# Patient Record
Sex: MALE | Race: WHITE | NOT HISPANIC OR LATINO | URBAN - METROPOLITAN AREA
[De-identification: names, ages, dates, MRNs, and addresses within clinical notes are randomized per-mention and may not be internally consistent; named-entity substitution may affect disease eponyms.]

---

## 2017-02-08 ENCOUNTER — OUTPATIENT (OUTPATIENT)
Dept: OUTPATIENT SERVICES | Facility: HOSPITAL | Age: 72
LOS: 1 days | Discharge: HOME | End: 2017-02-08

## 2017-05-01 ENCOUNTER — OUTPATIENT (OUTPATIENT)
Dept: OUTPATIENT SERVICES | Facility: HOSPITAL | Age: 72
LOS: 1 days | Discharge: HOME | End: 2017-05-01

## 2017-06-28 DIAGNOSIS — N50.819 TESTICULAR PAIN, UNSPECIFIED: ICD-10-CM

## 2017-07-05 ENCOUNTER — APPOINTMENT (OUTPATIENT)
Dept: HEMATOLOGY ONCOLOGY | Facility: CLINIC | Age: 72
End: 2017-07-05

## 2017-07-05 ENCOUNTER — OUTPATIENT (OUTPATIENT)
Dept: OUTPATIENT SERVICES | Facility: HOSPITAL | Age: 72
LOS: 1 days | Discharge: HOME | End: 2017-07-05

## 2017-07-05 VITALS
RESPIRATION RATE: 14 BRPM | HEIGHT: 69 IN | SYSTOLIC BLOOD PRESSURE: 150 MMHG | TEMPERATURE: 98.1 F | BODY MASS INDEX: 28.73 KG/M2 | DIASTOLIC BLOOD PRESSURE: 90 MMHG | HEART RATE: 81 BPM | WEIGHT: 194 LBS

## 2017-07-05 DIAGNOSIS — N39.0 URINARY TRACT INFECTION, SITE NOT SPECIFIED: ICD-10-CM

## 2017-07-05 DIAGNOSIS — M47.816 SPONDYLOSIS WITHOUT MYELOPATHY OR RADICULOPATHY, LUMBAR REGION: ICD-10-CM

## 2017-07-08 ENCOUNTER — OUTPATIENT (OUTPATIENT)
Dept: OUTPATIENT SERVICES | Facility: HOSPITAL | Age: 72
LOS: 1 days | Discharge: HOME | End: 2017-07-08

## 2017-07-08 DIAGNOSIS — D17.79 BENIGN LIPOMATOUS NEOPLASM OF OTHER SITES: ICD-10-CM

## 2017-07-08 DIAGNOSIS — M47.816 SPONDYLOSIS WITHOUT MYELOPATHY OR RADICULOPATHY, LUMBAR REGION: ICD-10-CM

## 2017-07-08 DIAGNOSIS — N39.0 URINARY TRACT INFECTION, SITE NOT SPECIFIED: ICD-10-CM

## 2017-07-17 DIAGNOSIS — D17.79 BENIGN LIPOMATOUS NEOPLASM OF OTHER SITES: ICD-10-CM

## 2017-08-09 ENCOUNTER — OUTPATIENT (OUTPATIENT)
Dept: OUTPATIENT SERVICES | Facility: HOSPITAL | Age: 72
LOS: 1 days | Discharge: HOME | End: 2017-08-09

## 2017-08-09 DIAGNOSIS — N45.1 EPIDIDYMITIS: ICD-10-CM

## 2017-08-09 DIAGNOSIS — M47.816 SPONDYLOSIS WITHOUT MYELOPATHY OR RADICULOPATHY, LUMBAR REGION: ICD-10-CM

## 2017-08-09 DIAGNOSIS — N39.0 URINARY TRACT INFECTION, SITE NOT SPECIFIED: ICD-10-CM

## 2017-08-11 ENCOUNTER — OUTPATIENT (OUTPATIENT)
Dept: OUTPATIENT SERVICES | Facility: HOSPITAL | Age: 72
LOS: 1 days | Discharge: HOME | End: 2017-08-11

## 2017-08-11 DIAGNOSIS — N45.1 EPIDIDYMITIS: ICD-10-CM

## 2017-08-11 DIAGNOSIS — M47.816 SPONDYLOSIS WITHOUT MYELOPATHY OR RADICULOPATHY, LUMBAR REGION: ICD-10-CM

## 2017-08-11 DIAGNOSIS — N39.0 URINARY TRACT INFECTION, SITE NOT SPECIFIED: ICD-10-CM

## 2017-08-24 ENCOUNTER — APPOINTMENT (OUTPATIENT)
Dept: UROLOGY | Facility: CLINIC | Age: 72
End: 2017-08-24
Payer: MEDICARE

## 2017-08-24 VITALS
HEART RATE: 92 BPM | HEIGHT: 68 IN | DIASTOLIC BLOOD PRESSURE: 78 MMHG | BODY MASS INDEX: 28.79 KG/M2 | SYSTOLIC BLOOD PRESSURE: 132 MMHG | WEIGHT: 190 LBS

## 2017-08-24 DIAGNOSIS — N50.819 TESTICULAR PAIN, UNSPECIFIED: ICD-10-CM

## 2017-08-24 PROCEDURE — 99213 OFFICE O/P EST LOW 20 MIN: CPT

## 2017-08-24 RX ORDER — CIPROFLOXACIN HYDROCHLORIDE 500 MG/1
500 TABLET, FILM COATED ORAL TWICE DAILY
Qty: 20 | Refills: 0 | Status: ACTIVE | COMMUNITY
Start: 2017-08-24 | End: 1900-01-01

## 2017-09-13 ENCOUNTER — RESULT REVIEW (OUTPATIENT)
Age: 72
End: 2017-09-13

## 2017-09-13 ENCOUNTER — OUTPATIENT (OUTPATIENT)
Dept: OUTPATIENT SERVICES | Facility: HOSPITAL | Age: 72
LOS: 1 days | Discharge: HOME | End: 2017-09-13

## 2017-09-13 ENCOUNTER — APPOINTMENT (OUTPATIENT)
Dept: HEMATOLOGY ONCOLOGY | Facility: CLINIC | Age: 72
End: 2017-09-13

## 2017-09-13 DIAGNOSIS — M47.816 SPONDYLOSIS WITHOUT MYELOPATHY OR RADICULOPATHY, LUMBAR REGION: ICD-10-CM

## 2017-09-13 DIAGNOSIS — D49.7 NEOPLASM OF UNSPECIFIED BEHAVIOR OF ENDOCRINE GLANDS AND OTHER PARTS OF NERVOUS SYSTEM: ICD-10-CM

## 2017-09-13 DIAGNOSIS — N39.0 URINARY TRACT INFECTION, SITE NOT SPECIFIED: ICD-10-CM

## 2017-09-21 ENCOUNTER — OUTPATIENT (OUTPATIENT)
Dept: OUTPATIENT SERVICES | Facility: HOSPITAL | Age: 72
LOS: 1 days | Discharge: HOME | End: 2017-09-21

## 2017-09-21 DIAGNOSIS — N20.0 CALCULUS OF KIDNEY: ICD-10-CM

## 2017-09-21 DIAGNOSIS — M47.816 SPONDYLOSIS WITHOUT MYELOPATHY OR RADICULOPATHY, LUMBAR REGION: ICD-10-CM

## 2017-09-21 DIAGNOSIS — N39.0 URINARY TRACT INFECTION, SITE NOT SPECIFIED: ICD-10-CM

## 2017-09-21 LAB
COMPN STONE: NORMAL
COMPN STONE: NORMAL
NIDUS STONE QL: NORMAL
SPECIMEN SOURCE: NORMAL
WT STONE: 0.04 G

## 2017-10-12 ENCOUNTER — APPOINTMENT (OUTPATIENT)
Dept: UROLOGY | Facility: CLINIC | Age: 72
End: 2017-10-12
Payer: MEDICARE

## 2017-10-12 PROCEDURE — 99211 OFF/OP EST MAY X REQ PHY/QHP: CPT

## 2017-10-25 ENCOUNTER — OUTPATIENT (OUTPATIENT)
Dept: OUTPATIENT SERVICES | Facility: HOSPITAL | Age: 72
LOS: 1 days | Discharge: HOME | End: 2017-10-25

## 2017-10-25 DIAGNOSIS — N39.0 URINARY TRACT INFECTION, SITE NOT SPECIFIED: ICD-10-CM

## 2017-10-25 DIAGNOSIS — Z00.00 ENCOUNTER FOR GENERAL ADULT MEDICAL EXAMINATION WITHOUT ABNORMAL FINDINGS: ICD-10-CM

## 2017-10-25 DIAGNOSIS — M47.816 SPONDYLOSIS WITHOUT MYELOPATHY OR RADICULOPATHY, LUMBAR REGION: ICD-10-CM

## 2017-10-30 LAB
COMPN STONE: NORMAL
COMPN STONE: NORMAL
NIDUS STONE QL: NORMAL
SPECIMEN SOURCE: NORMAL
WT STONE: 0.17 G

## 2017-11-02 ENCOUNTER — APPOINTMENT (OUTPATIENT)
Dept: UROLOGY | Facility: CLINIC | Age: 72
End: 2017-11-02
Payer: MEDICARE

## 2017-11-02 VITALS
WEIGHT: 190 LBS | DIASTOLIC BLOOD PRESSURE: 77 MMHG | BODY MASS INDEX: 28.79 KG/M2 | HEART RATE: 78 BPM | HEIGHT: 68 IN | SYSTOLIC BLOOD PRESSURE: 132 MMHG

## 2017-11-02 PROCEDURE — 99213 OFFICE O/P EST LOW 20 MIN: CPT

## 2017-12-07 ENCOUNTER — TRANSCRIPTION ENCOUNTER (OUTPATIENT)
Age: 72
End: 2017-12-07

## 2017-12-13 ENCOUNTER — OUTPATIENT (OUTPATIENT)
Dept: OUTPATIENT SERVICES | Facility: HOSPITAL | Age: 72
LOS: 1 days | Discharge: HOME | End: 2017-12-13

## 2017-12-13 DIAGNOSIS — M47.816 SPONDYLOSIS WITHOUT MYELOPATHY OR RADICULOPATHY, LUMBAR REGION: ICD-10-CM

## 2017-12-13 DIAGNOSIS — N39.0 URINARY TRACT INFECTION, SITE NOT SPECIFIED: ICD-10-CM

## 2017-12-15 DIAGNOSIS — E11.9 TYPE 2 DIABETES MELLITUS WITHOUT COMPLICATIONS: ICD-10-CM

## 2017-12-15 DIAGNOSIS — M65.312 TRIGGER THUMB, LEFT THUMB: ICD-10-CM

## 2018-01-04 ENCOUNTER — APPOINTMENT (OUTPATIENT)
Dept: UROLOGY | Facility: CLINIC | Age: 73
End: 2018-01-04

## 2018-01-10 ENCOUNTER — OUTPATIENT (OUTPATIENT)
Dept: OUTPATIENT SERVICES | Facility: HOSPITAL | Age: 73
LOS: 1 days | Discharge: HOME | End: 2018-01-10

## 2018-01-10 DIAGNOSIS — E78.5 HYPERLIPIDEMIA, UNSPECIFIED: ICD-10-CM

## 2018-01-10 DIAGNOSIS — D64.9 ANEMIA, UNSPECIFIED: ICD-10-CM

## 2018-01-10 DIAGNOSIS — M47.816 SPONDYLOSIS WITHOUT MYELOPATHY OR RADICULOPATHY, LUMBAR REGION: ICD-10-CM

## 2018-01-10 DIAGNOSIS — R53.82 CHRONIC FATIGUE, UNSPECIFIED: ICD-10-CM

## 2018-01-10 DIAGNOSIS — N39.0 URINARY TRACT INFECTION, SITE NOT SPECIFIED: ICD-10-CM

## 2018-01-10 DIAGNOSIS — E11.9 TYPE 2 DIABETES MELLITUS WITHOUT COMPLICATIONS: ICD-10-CM

## 2018-02-08 ENCOUNTER — APPOINTMENT (OUTPATIENT)
Dept: UROLOGY | Facility: CLINIC | Age: 73
End: 2018-02-08
Payer: MEDICARE

## 2018-02-08 VITALS
HEIGHT: 68 IN | SYSTOLIC BLOOD PRESSURE: 144 MMHG | HEART RATE: 72 BPM | WEIGHT: 190 LBS | BODY MASS INDEX: 28.79 KG/M2 | DIASTOLIC BLOOD PRESSURE: 74 MMHG

## 2018-02-08 PROCEDURE — 99213 OFFICE O/P EST LOW 20 MIN: CPT

## 2018-02-16 ENCOUNTER — TRANSCRIPTION ENCOUNTER (OUTPATIENT)
Age: 73
End: 2018-02-16

## 2018-02-22 ENCOUNTER — APPOINTMENT (OUTPATIENT)
Dept: UROLOGY | Facility: CLINIC | Age: 73
End: 2018-02-22

## 2018-03-29 ENCOUNTER — APPOINTMENT (OUTPATIENT)
Dept: UROLOGY | Facility: CLINIC | Age: 73
End: 2018-03-29
Payer: MEDICARE

## 2018-03-29 VITALS
WEIGHT: 190 LBS | BODY MASS INDEX: 28.79 KG/M2 | DIASTOLIC BLOOD PRESSURE: 71 MMHG | HEART RATE: 73 BPM | HEIGHT: 68 IN | SYSTOLIC BLOOD PRESSURE: 140 MMHG

## 2018-03-29 DIAGNOSIS — D17.79 BENIGN LIPOMATOUS NEOPLASM OF OTHER SITES: ICD-10-CM

## 2018-03-29 DIAGNOSIS — Z71.89 OTHER SPECIFIED COUNSELING: ICD-10-CM

## 2018-03-29 PROCEDURE — 99213 OFFICE O/P EST LOW 20 MIN: CPT

## 2018-03-29 NOTE — LETTER HEADER
[Ryan Maciel MD] : Ryan Maciel MD [Director of Urologic Oncology] : Director of Urologic Oncology [Cancer Services] : Cancer Services [Tel [phone]] : Tel [phone] [Fax [phone]] : Fax [phone]

## 2018-03-29 NOTE — ASSESSMENT
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

## 2018-03-29 NOTE — HISTORY OF PRESENT ILLNESS
[Currently Experiencing ___] :  [unfilled] [None] : None [FreeTextEntry1] : BINU WAGONER is a 72 year old male with a past medical history of kidney stones and BPH and on Flomax BID . Presents to the office today for a follow up, last seen on 2/8/2018. Patient is here to review 24 hour urine results. Patient states he has increased his daily fluid intake and decreased his salt intake. Last PSA 1.32 on 8/2017. \par 3/10/2018\par Urine Volume 1.85 (>2.5 L), SS CaOx 7.35 (  6-10), Urine OXalate 45 (20-40) , SS CaP 1.44 (0.5-2) , pH 6.211 (5.8-6.2) [Dysuria] : no dysuria [Hematuria - Gross] : no gross hematuria [Flank Pain] : no flank pain

## 2018-03-29 NOTE — LETTER BODY
[Dear  ___] : Dear  [unfilled], [Consult Letter:] : I had the pleasure of evaluating your patient, [unfilled]. [Please see my note below.] : Please see my note below. [Consult Closing:] : Thank you very much for allowing me to participate in the care of this patient.  If you have any questions, please do not hesitate to contact me. [Sincerely,] : Sincerely, [FreeTextEntry2] : Dr. Torrey Hanks

## 2018-07-25 ENCOUNTER — OUTPATIENT (OUTPATIENT)
Dept: OUTPATIENT SERVICES | Facility: HOSPITAL | Age: 73
LOS: 1 days | Discharge: HOME | End: 2018-07-25

## 2018-07-25 DIAGNOSIS — D17.79 BENIGN LIPOMATOUS NEOPLASM OF OTHER SITES: ICD-10-CM

## 2018-08-23 ENCOUNTER — APPOINTMENT (OUTPATIENT)
Dept: UROLOGY | Facility: CLINIC | Age: 73
End: 2018-08-23

## 2018-08-23 ENCOUNTER — APPOINTMENT (OUTPATIENT)
Dept: UROLOGY | Facility: CLINIC | Age: 73
End: 2018-08-23
Payer: MEDICARE

## 2018-08-30 ENCOUNTER — APPOINTMENT (OUTPATIENT)
Dept: UROLOGY | Facility: CLINIC | Age: 73
End: 2018-08-30
Payer: MEDICARE

## 2018-08-30 VITALS
BODY MASS INDEX: 28.79 KG/M2 | WEIGHT: 190 LBS | HEIGHT: 68 IN | SYSTOLIC BLOOD PRESSURE: 134 MMHG | HEART RATE: 76 BPM | DIASTOLIC BLOOD PRESSURE: 78 MMHG

## 2018-08-30 PROCEDURE — 99214 OFFICE O/P EST MOD 30 MIN: CPT

## 2018-09-03 NOTE — HISTORY OF PRESENT ILLNESS
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 \par CT scan 7/25/2018 - 7.7 x 4.9 x 5.9 cm\par stable in size with last study at [facility] in April 2016\par the US measurements 6.1 cm x 6.2 cm x 5.6 cm\par but the CT scan compared it to a prior study from 3/21/2010 - noted size change from 5.3 x 3.9 to 7.7 x 5.9 cm with coarse calcifications seen along the medial component - no hydronephrosis was seen\par 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 Hanks as well who manages his urinary complaints and follows the above mentioned adrenal myelolipoma\par \par he has passed several stones and he sent them to us for analysis \par calcium oxalate dihydrate\par calcium oxalate monohydrate\par carbonate Apatite\par \par \par the patient reports no recent stone passage or active issues\par no flank pain\par no hematuria\par no dysuria\par \par he is here to review his CT scan and discuss mgt of the adrenal

## 2018-09-03 NOTE — ASSESSMENT
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

## 2018-09-03 NOTE — LETTER BODY
[Dear  ___] : Dear  [unfilled], [Consult Letter:] : I had the pleasure of evaluating your patient, [unfilled]. [Please see my note below.] : Please see my note below. [Consult Closing:] : Thank you very much for allowing me to participate in the care of this patient.  If you have any questions, please do not hesitate to contact me. [Sincerely,] : Sincerely, [FreeTextEntry3] : Cadence Maciel MD, FACS\par

## 2018-10-27 ENCOUNTER — OUTPATIENT (OUTPATIENT)
Dept: OUTPATIENT SERVICES | Facility: HOSPITAL | Age: 73
LOS: 1 days | Discharge: HOME | End: 2018-10-27

## 2018-10-27 DIAGNOSIS — R91.8 OTHER NONSPECIFIC ABNORMAL FINDING OF LUNG FIELD: ICD-10-CM

## 2019-03-26 ENCOUNTER — TRANSCRIPTION ENCOUNTER (OUTPATIENT)
Age: 74
End: 2019-03-26

## 2019-04-06 ENCOUNTER — OUTPATIENT (OUTPATIENT)
Dept: OUTPATIENT SERVICES | Facility: HOSPITAL | Age: 74
LOS: 1 days | Discharge: HOME | End: 2019-04-06

## 2019-04-06 DIAGNOSIS — I10 ESSENTIAL (PRIMARY) HYPERTENSION: ICD-10-CM

## 2019-04-06 DIAGNOSIS — E11.9 TYPE 2 DIABETES MELLITUS WITHOUT COMPLICATIONS: ICD-10-CM

## 2019-04-06 DIAGNOSIS — E78.5 HYPERLIPIDEMIA, UNSPECIFIED: ICD-10-CM

## 2019-07-11 RX ORDER — TAMSULOSIN HYDROCHLORIDE 0.4 MG/1
0.4 CAPSULE ORAL
Qty: 3 | Refills: 0 | Status: ACTIVE | COMMUNITY

## 2019-07-25 ENCOUNTER — APPOINTMENT (OUTPATIENT)
Dept: UROLOGY | Facility: CLINIC | Age: 74
End: 2019-07-25
Payer: MEDICARE

## 2019-07-25 PROCEDURE — 99214 OFFICE O/P EST MOD 30 MIN: CPT

## 2019-07-25 NOTE — ASSESSMENT
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

## 2019-07-25 NOTE — HISTORY OF PRESENT ILLNESS
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 \par CT scan 7/25/2018 - 7.7 x 4.9 x 5.9 cm\par stable in size with last study at [facility] in April 2016\par the US measurements 6.1 cm x 6.2 cm x 5.6 cm\par but the CT scan compared it to a prior study from 3/21/2010 - noted size change from 5.3 x 3.9 to 7.7 x 5.9 cm with coarse calcifications seen along the medial component - no hydronephrosis was seen\par 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

## 2019-08-20 ENCOUNTER — OUTPATIENT (OUTPATIENT)
Dept: OUTPATIENT SERVICES | Facility: HOSPITAL | Age: 74
LOS: 1 days | Discharge: HOME | End: 2019-08-20

## 2019-08-20 DIAGNOSIS — R97.20 ELEVATED PROSTATE SPECIFIC ANTIGEN [PSA]: ICD-10-CM

## 2019-12-05 ENCOUNTER — OUTPATIENT (OUTPATIENT)
Dept: OUTPATIENT SERVICES | Facility: HOSPITAL | Age: 74
LOS: 1 days | Discharge: HOME | End: 2019-12-05

## 2019-12-05 DIAGNOSIS — D64.9 ANEMIA, UNSPECIFIED: ICD-10-CM

## 2019-12-05 DIAGNOSIS — E11.9 TYPE 2 DIABETES MELLITUS WITHOUT COMPLICATIONS: ICD-10-CM

## 2020-06-06 ENCOUNTER — OUTPATIENT (OUTPATIENT)
Dept: OUTPATIENT SERVICES | Facility: HOSPITAL | Age: 75
LOS: 1 days | Discharge: HOME | End: 2020-06-06
Payer: MEDICARE

## 2020-06-06 ENCOUNTER — RESULT REVIEW (OUTPATIENT)
Age: 75
End: 2020-06-06

## 2020-06-06 DIAGNOSIS — D49.7 NEOPLASM OF UNSPECIFIED BEHAVIOR OF ENDOCRINE GLANDS AND OTHER PARTS OF NERVOUS SYSTEM: ICD-10-CM

## 2020-06-06 PROCEDURE — 76770 US EXAM ABDO BACK WALL COMP: CPT | Mod: 26

## 2020-06-18 PROBLEM — N50.819 ORCHALGIA: Status: ACTIVE | Noted: 2017-08-24

## 2020-08-24 ENCOUNTER — APPOINTMENT (OUTPATIENT)
Dept: UROLOGY | Facility: CLINIC | Age: 75
End: 2020-08-24
Payer: MEDICARE

## 2020-08-24 VITALS — HEIGHT: 68 IN | BODY MASS INDEX: 28.79 KG/M2 | WEIGHT: 190 LBS | TEMPERATURE: 98.1 F

## 2020-08-24 DIAGNOSIS — D30.00 BENIGN NEOPLASM OF UNSPECIFIED KIDNEY: ICD-10-CM

## 2020-08-24 PROCEDURE — 99213 OFFICE O/P EST LOW 20 MIN: CPT

## 2020-08-24 NOTE — ASSESSMENT
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

## 2020-08-24 NOTE — HISTORY OF PRESENT ILLNESS
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 \par \par Right adrenal 5.2 x 5.5 x 5.9 cm calcium containing myelolipoma. \par \par Right renal mid pole 1.1 cm angiomyolipoma. Left renal cyst with mural \par calcifications, as above. \par \par Enlarged prostate measuring 46 cc in volume. \par \par Urinary bladder postvoid residual 115 cc. 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

## 2020-11-13 ENCOUNTER — RX RENEWAL (OUTPATIENT)
Age: 75
End: 2020-11-13

## 2021-01-21 ENCOUNTER — RX RENEWAL (OUTPATIENT)
Age: 76
End: 2021-01-21

## 2021-02-17 ENCOUNTER — RX RENEWAL (OUTPATIENT)
Age: 76
End: 2021-02-17

## 2021-03-11 ENCOUNTER — RESULT REVIEW (OUTPATIENT)
Age: 76
End: 2021-03-11

## 2021-03-11 ENCOUNTER — OUTPATIENT (OUTPATIENT)
Dept: OUTPATIENT SERVICES | Facility: HOSPITAL | Age: 76
LOS: 1 days | Discharge: HOME | End: 2021-03-11
Payer: MEDICARE

## 2021-03-11 DIAGNOSIS — R05 COUGH: ICD-10-CM

## 2021-03-11 PROCEDURE — 71046 X-RAY EXAM CHEST 2 VIEWS: CPT | Mod: 26

## 2021-03-12 ENCOUNTER — EMERGENCY (EMERGENCY)
Facility: HOSPITAL | Age: 76
LOS: 0 days | Discharge: HOME | End: 2021-03-12
Attending: STUDENT IN AN ORGANIZED HEALTH CARE EDUCATION/TRAINING PROGRAM | Admitting: STUDENT IN AN ORGANIZED HEALTH CARE EDUCATION/TRAINING PROGRAM
Payer: MEDICARE

## 2021-03-12 VITALS
OXYGEN SATURATION: 96 % | RESPIRATION RATE: 18 BRPM | DIASTOLIC BLOOD PRESSURE: 60 MMHG | SYSTOLIC BLOOD PRESSURE: 135 MMHG | TEMPERATURE: 103 F | HEART RATE: 86 BPM

## 2021-03-12 VITALS — SYSTOLIC BLOOD PRESSURE: 122 MMHG | HEART RATE: 72 BPM | RESPIRATION RATE: 18 BRPM | DIASTOLIC BLOOD PRESSURE: 62 MMHG

## 2021-03-12 DIAGNOSIS — F17.200 NICOTINE DEPENDENCE, UNSPECIFIED, UNCOMPLICATED: ICD-10-CM

## 2021-03-12 DIAGNOSIS — R10.84 GENERALIZED ABDOMINAL PAIN: ICD-10-CM

## 2021-03-12 DIAGNOSIS — U07.1 COVID-19: ICD-10-CM

## 2021-03-12 DIAGNOSIS — R50.9 FEVER, UNSPECIFIED: ICD-10-CM

## 2021-03-12 LAB
ALBUMIN SERPL ELPH-MCNC: 3.6 G/DL — SIGNIFICANT CHANGE UP (ref 3.5–5.2)
ALP SERPL-CCNC: 45 U/L — SIGNIFICANT CHANGE UP (ref 30–115)
ALT FLD-CCNC: 14 U/L — SIGNIFICANT CHANGE UP (ref 0–41)
ANION GAP SERPL CALC-SCNC: 12 MMOL/L — SIGNIFICANT CHANGE UP (ref 7–14)
APPEARANCE UR: CLEAR — SIGNIFICANT CHANGE UP
AST SERPL-CCNC: 22 U/L — SIGNIFICANT CHANGE UP (ref 0–41)
BACTERIA # UR AUTO: NEGATIVE — SIGNIFICANT CHANGE UP
BASOPHILS # BLD AUTO: 0.02 K/UL — SIGNIFICANT CHANGE UP (ref 0–0.2)
BASOPHILS NFR BLD AUTO: 0.4 % — SIGNIFICANT CHANGE UP (ref 0–1)
BILIRUB SERPL-MCNC: 1 MG/DL — SIGNIFICANT CHANGE UP (ref 0.2–1.2)
BILIRUB UR-MCNC: NEGATIVE — SIGNIFICANT CHANGE UP
BUN SERPL-MCNC: 18 MG/DL — SIGNIFICANT CHANGE UP (ref 10–20)
CALCIUM SERPL-MCNC: 8.8 MG/DL — SIGNIFICANT CHANGE UP (ref 8.5–10.1)
CHLORIDE SERPL-SCNC: 98 MMOL/L — SIGNIFICANT CHANGE UP (ref 98–110)
CO2 SERPL-SCNC: 24 MMOL/L — SIGNIFICANT CHANGE UP (ref 17–32)
COLOR SPEC: YELLOW — SIGNIFICANT CHANGE UP
CREAT SERPL-MCNC: 0.9 MG/DL — SIGNIFICANT CHANGE UP (ref 0.7–1.5)
DIFF PNL FLD: ABNORMAL
EOSINOPHIL # BLD AUTO: 0.03 K/UL — SIGNIFICANT CHANGE UP (ref 0–0.7)
EOSINOPHIL NFR BLD AUTO: 0.6 % — SIGNIFICANT CHANGE UP (ref 0–8)
EPI CELLS # UR: 3 /HPF — SIGNIFICANT CHANGE UP (ref 0–5)
GLUCOSE SERPL-MCNC: 141 MG/DL — HIGH (ref 70–99)
GLUCOSE UR QL: NEGATIVE — SIGNIFICANT CHANGE UP
HCT VFR BLD CALC: 32.9 % — LOW (ref 42–52)
HGB BLD-MCNC: 11.1 G/DL — LOW (ref 14–18)
HYALINE CASTS # UR AUTO: 4 /LPF — SIGNIFICANT CHANGE UP (ref 0–7)
IMM GRANULOCYTES NFR BLD AUTO: 0.2 % — SIGNIFICANT CHANGE UP (ref 0.1–0.3)
KETONES UR-MCNC: NEGATIVE — SIGNIFICANT CHANGE UP
LACTATE SERPL-SCNC: 1.3 MMOL/L — SIGNIFICANT CHANGE UP (ref 0.7–2)
LEUKOCYTE ESTERASE UR-ACNC: ABNORMAL
LIDOCAIN IGE QN: 20 U/L — SIGNIFICANT CHANGE UP (ref 7–60)
LYMPHOCYTES # BLD AUTO: 0.89 K/UL — LOW (ref 1.2–3.4)
LYMPHOCYTES # BLD AUTO: 18.9 % — LOW (ref 20.5–51.1)
MCHC RBC-ENTMCNC: 32.8 PG — HIGH (ref 27–31)
MCHC RBC-ENTMCNC: 33.7 G/DL — SIGNIFICANT CHANGE UP (ref 32–37)
MCV RBC AUTO: 97.3 FL — HIGH (ref 80–94)
MONOCYTES # BLD AUTO: 0.66 K/UL — HIGH (ref 0.1–0.6)
MONOCYTES NFR BLD AUTO: 14 % — HIGH (ref 1.7–9.3)
NEUTROPHILS # BLD AUTO: 3.11 K/UL — SIGNIFICANT CHANGE UP (ref 1.4–6.5)
NEUTROPHILS NFR BLD AUTO: 65.9 % — SIGNIFICANT CHANGE UP (ref 42.2–75.2)
NITRITE UR-MCNC: NEGATIVE — SIGNIFICANT CHANGE UP
NRBC # BLD: 0 /100 WBCS — SIGNIFICANT CHANGE UP (ref 0–0)
PH UR: 6 — SIGNIFICANT CHANGE UP (ref 5–8)
PLATELET # BLD AUTO: 141 K/UL — SIGNIFICANT CHANGE UP (ref 130–400)
POTASSIUM SERPL-MCNC: 4.1 MMOL/L — SIGNIFICANT CHANGE UP (ref 3.5–5)
POTASSIUM SERPL-SCNC: 4.1 MMOL/L — SIGNIFICANT CHANGE UP (ref 3.5–5)
PROT SERPL-MCNC: 6.4 G/DL — SIGNIFICANT CHANGE UP (ref 6–8)
PROT UR-MCNC: ABNORMAL
RBC # BLD: 3.38 M/UL — LOW (ref 4.7–6.1)
RBC # FLD: 14.8 % — HIGH (ref 11.5–14.5)
RBC CASTS # UR COMP ASSIST: 5 /HPF — HIGH (ref 0–4)
SARS-COV-2 RNA SPEC QL NAA+PROBE: DETECTED
SODIUM SERPL-SCNC: 134 MMOL/L — LOW (ref 135–146)
SP GR SPEC: 1.03 — SIGNIFICANT CHANGE UP (ref 1.01–1.03)
UROBILINOGEN FLD QL: ABNORMAL
WBC # BLD: 4.72 K/UL — LOW (ref 4.8–10.8)
WBC # FLD AUTO: 4.72 K/UL — LOW (ref 4.8–10.8)
WBC UR QL: 13 /HPF — HIGH (ref 0–5)

## 2021-03-12 PROCEDURE — 71045 X-RAY EXAM CHEST 1 VIEW: CPT | Mod: 26

## 2021-03-12 PROCEDURE — 74177 CT ABD & PELVIS W/CONTRAST: CPT | Mod: 26

## 2021-03-12 PROCEDURE — 99285 EMERGENCY DEPT VISIT HI MDM: CPT

## 2021-03-12 RX ORDER — ACETAMINOPHEN 500 MG
975 TABLET ORAL ONCE
Refills: 0 | Status: COMPLETED | OUTPATIENT
Start: 2021-03-12 | End: 2021-03-12

## 2021-03-12 RX ADMIN — Medication 975 MILLIGRAM(S): at 15:05

## 2021-03-12 NOTE — ED ADULT NURSE NOTE - NSIMPLEMENTINTERV_GEN_ALL_ED
Implemented All Fall with Harm Risk Interventions:  [place] to call system. Call bell, personal items and telephone within reach. Instruct patient to call for assistance. Room bathroom lighting operational. Non-slip footwear when patient is off stretcher. Physically safe environment: no spills, clutter or unnecessary equipment. Stretcher in lowest position, wheels locked, appropriate side rails in place. Provide visual cue, wrist band, yellow gown, etc. Monitor gait and stability. Monitor for mental status changes and reorient to person, place, and time. Review medications for side effects contributing to fall risk. Reinforce activity limits and safety measures with patient and family. Provide visual clues: red socks.

## 2021-03-12 NOTE — ED PROVIDER NOTE - NS ED ROS FT
Review of Systems:  	•	CONSTITUTIONAL - + fever, no diaphoresis  	•	SKIN - no rash  	•	HEMATOLOGIC - no bleeding, no bruising  	•	EYES - no eye pain, no blurry vision  	•	ENT - no congestion  	•	RESPIRATORY - no shortness of breath, no cough  	•	CARDIAC - no chest pain, no palpitations  	•	GI - no abd pain, no nausea, no vomiting, no diarrhea, no constipation  	•	GENITO-URINARY - no dysuria; no hematuria, no increased urinary frequency  	•	MUSCULOSKELETAL - no joint paint, no swelling, no redness  	•	NEUROLOGIC - no weakness, no headache, no paresthesias, no LOC  	•	PSYCH - no anxiety, no depression  	All other ROS are negative except as documented in HPI.

## 2021-03-12 NOTE — ED PROVIDER NOTE - PATIENT PORTAL LINK FT
You can access the FollowMyHealth Patient Portal offered by [facility] by registering at the following website: [email or web address]. By joining [facility]’s FollowMyHealth portal, you will also be able to view your health information using other applications (apps) compatible with our system.

## 2021-03-12 NOTE — ED ADULT NURSE NOTE - PRO INTERPRETER NEED 2
Bedside shift change report given to Naomi Gurrola RN (oncoming nurse) by Amara Worthington RN (offgoing nurse). Report included the following information SBAR, Kardex, ED Summary, Intake/Output, MAR, Accordion, Recent Results, Med Rec Status and Cardiac Rhythm NSR. English

## 2021-03-12 NOTE — ED PROVIDER NOTE - CLINICAL SUMMARY MEDICAL DECISION MAKING FREE TEXT BOX
I personally evaluated the patient. I reviewed the Resident’s or Physician Assistant’s note (as assigned above), and agree with the findings and plan except as documented in my note. Patient evaluated for fever. Labs, cxr, ct abd/pelvis performed in ed. Patient afebrile, well appearing, vs stable prior to discharge. Given return precautions and isolation precautions.. I have fully discussed the medical management and delivery of care with the patient. I have discussed any available labs, imaging and treatment options with the patient. Patient confirms understanding and has been given detailed return precautions. Patient instructed to return to the ED should symptoms persist or worsen. Patient has demonstrated capacity and has verbalized understanding. Patient is well appearing upon discharge.

## 2021-03-12 NOTE — ED PROVIDER NOTE - ATTENDING CONTRIBUTION TO CARE
74 yo M pmh dm pw fever. Fever and body aches for the past week. No cp, no sob, no abd pain, no back pain, no n/v, no diarrhea, no urinary sx, no headache, no neck pain.     CONSTITUTIONAL: Well-developed; well-nourished; in no acute distress. Sitting up and providing appropriate history and physical examination  SKIN: skin exam is warm and dry, no acute rash.  HEAD: Normocephalic; atraumatic.  EYES: PERRL, 3 mm bilateral, no nystagmus, EOM intact; conjunctiva and sclera clear.  ENT: No nasal discharge; airway clear.  NECK: Supple; non tender. + full passive ROM in all directions. No JVD  CARD: S1, S2 normal; no murmurs, gallops, or rubs. Regular rate and rhythm. + Symmetric Strong Pulses  RESP: No wheezes, rales or rhonchi. Good air movement bilaterally  ABD: +ttp diffusely over abdomen. soft; non-distended. No Rebound, No Guarding, No signs of peritonitis, No CVA tenderness. No pulsatile abdominal mass. + Strong and Symmetric Pulses  EXT: Normal ROM. No clubbing, cyanosis or edema. Dp and Pt Pulses intact. Cap refill less than 3 seconds  NEURO: CN 2-12 intact, normal finger to nose, normal romberg, stable gait, no sensory or motor deficits, Alert, oriented, grossly unremarkable. No Focal deficits. GCS 15. NIH 0  PSYCH: Cooperative, appropriate.

## 2021-03-12 NOTE — ED PROVIDER NOTE - NSFOLLOWUPINSTRUCTIONS_ED_ALL_ED_FT
Please follow up with your primary care physician within 24-72 hours and return immediately if symptoms worsen.    Novel Coronavirus (COVID-19)  The Facts  What is a coronavirus?  Coronaviruses are a large family of viruses that cause illnesses ranging from the common cold  to more severe diseases such as Middle East Respiratory Syndrome (MERS) and Severe Acute  Respiratory Syndrome (SARS).  What is Novel Coronavirus (COVID-19)?  COVID-19 is a new strain of Coronavirus that has not been previously identified in humans. COVID-19  was identified in [place], [place], [place] in December 2019 (COVID-19). COVID-19 has  since been identified outside of [place], in a growing number of countries internationally, including  the [place].  Where can I find the most recent information about COVID-19?  The Centers for Disease Control and Prevention (CDC) is closely monitoring the outbreak caused by the  COVID-19. For the latest information about COVID- 19, visit the CDC website at  https://www.cdc.gov/coronavirus/index.html  How are coronaviruses spread?  Coronaviruses can be transmitted from person-to- person, usually after close contact with an infected person,  for example, in a household, workplace, or healthcare setting via droplets that become airborne after a cough  or sneeze by an affected person. These droplets can then infect a nearby person. It is likely transmission also  occurs by touching recently contaminated surfaces.  What are the symptoms of coronavirus infection?  It depends on the virus, but common signs include fever and/or respiratory symptoms such as  cough and shortness of breath. In more severe cases, infection can cause pneumonia, severe acute  respiratory syndrome, kidney failure and even death. Fortunately, most cases of COVID-19 have an  illness no different than the influenza “flu”. With a majority of these patients having mild symptoms  and overall mortality which appears to be not much different than the flu.  Is there a treatment for a COVID-19?  There is no specific treatment for disease caused by COVID-19. However, many of the symptoms can  be treated based on the patient’s clinical condition. Supportive care for infected persons can be highly  effective.  What can I do to protect myself?  Washing your hands, covering your cough, and disinfecting surfaces are the best precautionary  measures. It is also advisable to avoid close contact with anyone showing symptoms of respiratory  illness such as coughing and sneezing. Those with symptoms should wear a surgical mask when  around others.  What can I do to protect those around me?  If you have been identified as someone who may be infected with COVID-19, we recommend you  follow the self-isolation procedures outlined below to protect those around you and limit the spread  of this virus.   March 3, 2020  Recommendations for Patients Advised to Self-Isolate  for Possible COVID-19 Exposure  We recommend the below precautionary steps from now until 14 days from when you  returned from your travel or date of your last known possible contact:  - Do not go to work, school, or public areas. Avoid using public transportation, ride-sharing, or  taxis.  - As much as possible, separate yourself from other people in your home. If you can, you should  stay in a room and away from other people in your home. Also, you should use a separate  bathroom, if available.  - Wear the supplied mask whenever you are around other people.  - If you have a non-urgent medical appointment, please reschedule for a later date. If the  appointment is urgent, please call the healthcare provider and tell them that you are on selfisolation for possible COVID-19. This will help the healthcare provider’s office take steps to keep  other people from getting infected or exposed. If you can reschedule routine appointments, do  so.  - Wash your hands often with soap and water for at least 15 to 20 seconds or clean your hands  with an alcohol-based hand  that contains 60 to 95% alcohol, covering all surfaces of  your hands and rubbing them together until they feel dry. Soap and water should be used  preferentially if hands are visibly dirty.  - Cover your mouth and nose with a tissue when you cough or sneeze. Throw used tissues in a  lined trash can; immediately wash your hands.  - Avoid touching your eyes, nose, and mouth with your hands.  - Avoid sharing personal household items. You should not share dishes, drinking glasses, cups,  eating utensils, towels, or bedding with other people or pets in your home. After using these  items, they should be washed thoroughly with soap and water.  - Clean and disinfect all “high-touch” surfaces every day. High touch surfaces include counters,  tabletops, doorknobs, light switches, remote controls, bathroom fixtures, toilets, phones,  keyboards, tablets, and bedside tables. Also, clean any surfaces that may have blood, stool, or  body fluids on them.       If you develop worsening symptoms:  - If you develop worsening symptoms, such as severe shortness of breath, please call [phone] option #9. They will assist you in determining your next steps.  During your time on self-isolation do the following:  - Work from home if you are able to so.  - Limit social isolation by talking with friends and family on the phone or with face-time  - Talk with friends and relatives who don’t live with you about supporting each other if one  household has to be quarantined. For example, agree to drop groceries or other supplies at the  front door.  - Exercise and spend time outdoors away from others if able to do so.    Why didn’t I get tested for novel coronavirus (COVID-19)?  The number of available tests is very limited so strict rules exist for who is allowed to be tested.  [facility] has been authorized to perform testing and is currently working hard to be  able to start providing the test. Such testing is currently reserved for patients who have had  contact with someone infected with the virus, or those who are very sick a plus those who have  traveled to areas identified by the Centers for Disease Control and Prevention (CD) and will  require hospitalization.  What should I do now?  If you are well enough to be discharged home and are not in a high risk group to have  contracted the COVID-10, you should care for yourself at home exactly like you would if you  have Influenza “flu”. Follow all the standard guidelines about washing your hands, covering  your cough, etc.  You should return to the Emergency Department if you develop worse symptoms, trouble  breathing, chest pain, and/or a fever that doesn’t improve with over the counter  acetaminophen or ibuprofen.

## 2021-03-12 NOTE — ED ADULT NURSE NOTE - OBJECTIVE STATEMENT
Pt presented to ED c/o fevers x2 days. Pt c/o fevers/chills and SOB on exertion. Denies n/v/d. Pt A&Ox4, ambulatory baseline. Fall precautions initiated, BA in place. Pt placed on Tele/O2 monitor.

## 2021-03-14 LAB
CULTURE RESULTS: SIGNIFICANT CHANGE UP
SPECIMEN SOURCE: SIGNIFICANT CHANGE UP

## 2021-03-18 LAB
CULTURE RESULTS: SIGNIFICANT CHANGE UP
CULTURE RESULTS: SIGNIFICANT CHANGE UP
SPECIMEN SOURCE: SIGNIFICANT CHANGE UP
SPECIMEN SOURCE: SIGNIFICANT CHANGE UP

## 2021-04-09 ENCOUNTER — NON-APPOINTMENT (OUTPATIENT)
Age: 76
End: 2021-04-09

## 2021-04-09 DIAGNOSIS — N39.0 URINARY TRACT INFECTION, SITE NOT SPECIFIED: ICD-10-CM

## 2021-05-17 ENCOUNTER — APPOINTMENT (OUTPATIENT)
Dept: UROLOGY | Facility: CLINIC | Age: 76
End: 2021-05-17

## 2021-06-05 ENCOUNTER — RX RENEWAL (OUTPATIENT)
Age: 76
End: 2021-06-05

## 2021-08-14 ENCOUNTER — RX RENEWAL (OUTPATIENT)
Age: 76
End: 2021-08-14

## 2021-08-30 ENCOUNTER — APPOINTMENT (OUTPATIENT)
Dept: UROLOGY | Facility: CLINIC | Age: 76
End: 2021-08-30
Payer: MEDICARE

## 2021-08-30 PROCEDURE — 99214 OFFICE O/P EST MOD 30 MIN: CPT

## 2021-08-31 NOTE — ASSESSMENT
[FreeTextEntry1] : 74 yo with history of myolipoma and 1.6 cm angiomyolipoma, renal stones asymptomatic\par urinary complaints, no stones on CT scan\par \par - discussed the need for a repeat PSA\par - discussed the benign nature of the adrenal tumors\par - will contact me with his PSA levels\par - all questions answered\par - f/u in 6 months to re-assess \par \par

## 2021-08-31 NOTE — HISTORY OF PRESENT ILLNESS
[FreeTextEntry1] : 76 yo with adrenal myelolipoma - right sided - 7 cm \par \par CT scan 3/2021\par \par IMPRESSION:\par \par Few scattered bibasilar groundglass opacities, compatible with a viral pneumonia and patient's known COVID status.\par \par Stable 7.7 cm right adrenal gland mass described above, which may represent an adrenal myelolipoma.\par \par Stable 1.6 cm right renal AML.\par \par Renal US 6/2020\par IMPRESSION: \par \par No hydronephrosis bilaterally. \par \par Right adrenal 5.2 x 5.5 x 5.9 cm calcium containing myelolipoma. \par \par Right renal mid pole 1.1 cm angiomyolipoma. Left renal cyst with mural \par calcifications, as above. \par \par Enlarged prostate measuring 46 cc in volume. \par \par Urinary bladder postvoid residual 115 cc. 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

## 2021-11-04 ENCOUNTER — RX RENEWAL (OUTPATIENT)
Age: 76
End: 2021-11-04

## 2022-04-07 ENCOUNTER — RX RENEWAL (OUTPATIENT)
Age: 77
End: 2022-04-07

## 2022-05-02 ENCOUNTER — APPOINTMENT (OUTPATIENT)
Dept: UROLOGY | Facility: CLINIC | Age: 77
End: 2022-05-02
Payer: MEDICARE

## 2022-05-02 VITALS — BODY MASS INDEX: 28.79 KG/M2 | WEIGHT: 190 LBS | HEIGHT: 68 IN

## 2022-05-02 PROCEDURE — 99214 OFFICE O/P EST MOD 30 MIN: CPT

## 2022-05-02 RX ORDER — TADALAFIL 5 MG/1
5 TABLET ORAL WEEKLY
Qty: 5 | Refills: 6 | Status: ACTIVE | COMMUNITY
Start: 2022-05-02 | End: 1900-01-01

## 2022-05-02 NOTE — ASSESSMENT
[FreeTextEntry1] : 75 yo with history of myolipoma and 1.6 cm angiomyolipoma, renal stones asymptomatic\par urinary complaints, no stones on last CT scan\par \par - repeat PSA\par - Renal and bladder US\par - will review studies via phone\par - f/u in 6 months to re-assess\par - tadalifil 5 mg po prn for ED\par

## 2022-05-02 NOTE — HISTORY OF PRESENT ILLNESS
[FreeTextEntry1] : 75 yo with adrenal myelolipoma - right sided - 7 cm \par history of BPH\par history of nephrolithiasis\par \par HgbA1c - 7.8 (1/2022)\par PVR today 39 ml\par \par PSA \par \par CT scan 3/2021\par \par IMPRESSION:\par \par Few scattered bibasilar ground glass opacities, compatible with a viral pneumonia and patient's known COVID status.\par \par Stable 7.7 cm right adrenal gland mass described above, which may represent an adrenal myelolipoma.\par \par Stable 1.6 cm right renal AML.\par \par Renal US 6/2020\par IMPRESSION: \par \par No hydronephrosis bilaterally. \par \par Right adrenal 5.2 x 5.5 x 5.9 cm calcium containing myelolipoma. \par \par Right renal mid pole 1.1 cm angiomyolipoma. Left renal cyst with mural \par calcifications, as above. \par \par Enlarged prostate measuring 46 cc in volume. \par \par Urinary bladder postvoid residual 115 cc. \par \par CT scan 7/25/2018 - 7.7 x 4.9 x 5.9 cm\par stable in size with last study at 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

## 2022-05-03 LAB
PSA FREE FLD-MCNC: 45 %
PSA FREE SERPL-MCNC: 1.43 NG/ML
PSA SERPL-MCNC: 3.21 NG/ML

## 2022-06-22 ENCOUNTER — OUTPATIENT (OUTPATIENT)
Dept: OUTPATIENT SERVICES | Facility: HOSPITAL | Age: 77
LOS: 1 days | Discharge: HOME | End: 2022-06-22
Payer: MEDICARE

## 2022-06-22 ENCOUNTER — RESULT REVIEW (OUTPATIENT)
Age: 77
End: 2022-06-22

## 2022-06-22 DIAGNOSIS — D17.79 BENIGN LIPOMATOUS NEOPLASM OF OTHER SITES: ICD-10-CM

## 2022-06-22 DIAGNOSIS — N40.1 BENIGN PROSTATIC HYPERPLASIA WITH LOWER URINARY TRACT SYMPTOMS: ICD-10-CM

## 2022-06-22 PROCEDURE — 76770 US EXAM ABDO BACK WALL COMP: CPT | Mod: 26

## 2022-07-07 ENCOUNTER — APPOINTMENT (OUTPATIENT)
Dept: UROLOGY | Facility: CLINIC | Age: 77
End: 2022-07-07

## 2022-07-07 DIAGNOSIS — N28.1 CYST OF KIDNEY, ACQUIRED: ICD-10-CM

## 2022-07-07 PROCEDURE — 99214 OFFICE O/P EST MOD 30 MIN: CPT

## 2022-07-07 NOTE — HISTORY OF PRESENT ILLNESS
[Urinary Urgency] : urinary urgency [Urinary Frequency] : urinary frequency [Nocturia] : nocturia [Straining] : straining [Weak Stream] : weak stream [FreeTextEntry1] : 77 yo with adrenal myelolipoma - right sided - 7 cm \par history of BPH\par history of nephrolithiasis\par \par HgbA1c - 7.8 (1/2022)\par PVR today 39 ml\par \par PSA 5/3/22\par 3.21 ng/ml\par 45% free\par \par Renal and Bladder US 6/2022\par \par Right kidney: 12.6 cm. 1.2 cm lower pole angiomyolipoma. 8 mm simple cyst. No hydronephrosis.\par \par Left kidney: 12.7 cm. 1.4 cm upper pole cyst. 1.9 cm x 1.6 cm mid pole cyst with a thin septae. No hydronephrosis.\par \par Urinary bladder: No debris or calculus. Bilateral ureteral jets are visualized. Prevoid volume of approximately 245 ml. 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 \par \par Right adrenal 5.2 x 5.5 x 5.9 cm calcium containing myelolipoma. \par \par Right renal mid pole 1.1 cm angiomyolipoma. Left renal cyst with mural \par calcifications, as above. \par \par Enlarged prostate measuring 46 cc in volume. \par \par Urinary bladder postvoid residual 115 cc. 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

## 2022-07-07 NOTE — ASSESSMENT
[FreeTextEntry1] : 77 yo with history of myolipoma and 1.6 cm angiomyolipoma, renal stones asymptomatic\par urinary complaints, no stones on last CT scan\par \par - repeat PSA in 6 months\par - Renal and bladder US reviewed (no mention of right adrenal)\par - CT scan with contrast\par - f/u in 6 months to re-assess\par - continue flomax\par

## 2022-07-20 RX ORDER — CLOTRIMAZOLE AND BETAMETHASONE DIPROPIONATE 10; .5 MG/G; MG/G
1-0.05 CREAM TOPICAL TWICE DAILY
Qty: 1 | Refills: 2 | Status: ACTIVE | COMMUNITY
Start: 2022-07-20 | End: 1900-01-01

## 2022-08-11 ENCOUNTER — EMERGENCY (EMERGENCY)
Facility: HOSPITAL | Age: 77
LOS: 0 days | Discharge: HOME | End: 2022-08-12
Attending: STUDENT IN AN ORGANIZED HEALTH CARE EDUCATION/TRAINING PROGRAM | Admitting: STUDENT IN AN ORGANIZED HEALTH CARE EDUCATION/TRAINING PROGRAM

## 2022-08-11 VITALS
TEMPERATURE: 97 F | DIASTOLIC BLOOD PRESSURE: 66 MMHG | SYSTOLIC BLOOD PRESSURE: 130 MMHG | OXYGEN SATURATION: 98 % | HEART RATE: 84 BPM | RESPIRATION RATE: 20 BRPM

## 2022-08-11 VITALS
DIASTOLIC BLOOD PRESSURE: 60 MMHG | TEMPERATURE: 97 F | RESPIRATION RATE: 19 BRPM | HEART RATE: 75 BPM | OXYGEN SATURATION: 97 % | SYSTOLIC BLOOD PRESSURE: 130 MMHG

## 2022-08-11 DIAGNOSIS — R10.9 UNSPECIFIED ABDOMINAL PAIN: ICD-10-CM

## 2022-08-11 DIAGNOSIS — N40.0 BENIGN PROSTATIC HYPERPLASIA WITHOUT LOWER URINARY TRACT SYMPTOMS: ICD-10-CM

## 2022-08-11 DIAGNOSIS — I45.10 UNSPECIFIED RIGHT BUNDLE-BRANCH BLOCK: ICD-10-CM

## 2022-08-11 DIAGNOSIS — K59.9 FUNCTIONAL INTESTINAL DISORDER, UNSPECIFIED: ICD-10-CM

## 2022-08-11 DIAGNOSIS — N13.2 HYDRONEPHROSIS WITH RENAL AND URETERAL CALCULOUS OBSTRUCTION: ICD-10-CM

## 2022-08-11 DIAGNOSIS — K59.00 CONSTIPATION, UNSPECIFIED: ICD-10-CM

## 2022-08-11 DIAGNOSIS — F17.200 NICOTINE DEPENDENCE, UNSPECIFIED, UNCOMPLICATED: ICD-10-CM

## 2022-08-11 DIAGNOSIS — I10 ESSENTIAL (PRIMARY) HYPERTENSION: ICD-10-CM

## 2022-08-11 DIAGNOSIS — E11.9 TYPE 2 DIABETES MELLITUS WITHOUT COMPLICATIONS: ICD-10-CM

## 2022-08-11 DIAGNOSIS — R11.0 NAUSEA: ICD-10-CM

## 2022-08-11 LAB
ALBUMIN SERPL ELPH-MCNC: 4.2 G/DL — SIGNIFICANT CHANGE UP (ref 3.5–5.2)
ALP SERPL-CCNC: 69 U/L — SIGNIFICANT CHANGE UP (ref 30–115)
ALT FLD-CCNC: 12 U/L — SIGNIFICANT CHANGE UP (ref 0–41)
ANION GAP SERPL CALC-SCNC: 10 MMOL/L — SIGNIFICANT CHANGE UP (ref 7–14)
APPEARANCE UR: CLEAR — SIGNIFICANT CHANGE UP
AST SERPL-CCNC: 17 U/L — SIGNIFICANT CHANGE UP (ref 0–41)
BACTERIA # UR AUTO: NEGATIVE — SIGNIFICANT CHANGE UP
BASOPHILS # BLD AUTO: 0.03 K/UL — SIGNIFICANT CHANGE UP (ref 0–0.2)
BASOPHILS NFR BLD AUTO: 0.2 % — SIGNIFICANT CHANGE UP (ref 0–1)
BILIRUB SERPL-MCNC: 0.6 MG/DL — SIGNIFICANT CHANGE UP (ref 0.2–1.2)
BILIRUB UR-MCNC: NEGATIVE — SIGNIFICANT CHANGE UP
BUN SERPL-MCNC: 20 MG/DL — SIGNIFICANT CHANGE UP (ref 10–20)
CALCIUM SERPL-MCNC: 10.1 MG/DL — SIGNIFICANT CHANGE UP (ref 8.5–10.1)
CHLORIDE SERPL-SCNC: 98 MMOL/L — SIGNIFICANT CHANGE UP (ref 98–110)
CO2 SERPL-SCNC: 31 MMOL/L — SIGNIFICANT CHANGE UP (ref 17–32)
COLOR SPEC: SIGNIFICANT CHANGE UP
CREAT SERPL-MCNC: 1.3 MG/DL — SIGNIFICANT CHANGE UP (ref 0.7–1.5)
DIFF PNL FLD: ABNORMAL
EGFR: 57 ML/MIN/1.73M2 — LOW
EOSINOPHIL # BLD AUTO: 0.01 K/UL — SIGNIFICANT CHANGE UP (ref 0–0.7)
EOSINOPHIL NFR BLD AUTO: 0.1 % — SIGNIFICANT CHANGE UP (ref 0–8)
EPI CELLS # UR: 1 /HPF — SIGNIFICANT CHANGE UP (ref 0–5)
GLUCOSE SERPL-MCNC: 189 MG/DL — HIGH (ref 70–99)
GLUCOSE UR QL: SIGNIFICANT CHANGE UP
HCT VFR BLD CALC: 36.6 % — LOW (ref 42–52)
HGB BLD-MCNC: 12.3 G/DL — LOW (ref 14–18)
HYALINE CASTS # UR AUTO: 1 /LPF — SIGNIFICANT CHANGE UP (ref 0–7)
IMM GRANULOCYTES NFR BLD AUTO: 0.4 % — HIGH (ref 0.1–0.3)
KETONES UR-MCNC: NEGATIVE — SIGNIFICANT CHANGE UP
LACTATE SERPL-SCNC: 3.3 MMOL/L — HIGH (ref 0.7–2)
LEUKOCYTE ESTERASE UR-ACNC: NEGATIVE — SIGNIFICANT CHANGE UP
LIDOCAIN IGE QN: 12 U/L — SIGNIFICANT CHANGE UP (ref 7–60)
LYMPHOCYTES # BLD AUTO: 1.44 K/UL — SIGNIFICANT CHANGE UP (ref 1.2–3.4)
LYMPHOCYTES # BLD AUTO: 11.2 % — LOW (ref 20.5–51.1)
MCHC RBC-ENTMCNC: 32.7 PG — HIGH (ref 27–31)
MCHC RBC-ENTMCNC: 33.6 G/DL — SIGNIFICANT CHANGE UP (ref 32–37)
MCV RBC AUTO: 97.3 FL — HIGH (ref 80–94)
MONOCYTES # BLD AUTO: 1.35 K/UL — HIGH (ref 0.1–0.6)
MONOCYTES NFR BLD AUTO: 10.5 % — HIGH (ref 1.7–9.3)
NEUTROPHILS # BLD AUTO: 9.95 K/UL — HIGH (ref 1.4–6.5)
NEUTROPHILS NFR BLD AUTO: 77.6 % — HIGH (ref 42.2–75.2)
NITRITE UR-MCNC: NEGATIVE — SIGNIFICANT CHANGE UP
NRBC # BLD: 0 /100 WBCS — SIGNIFICANT CHANGE UP (ref 0–0)
PH UR: 7.5 — SIGNIFICANT CHANGE UP (ref 5–8)
PLATELET # BLD AUTO: 281 K/UL — SIGNIFICANT CHANGE UP (ref 130–400)
POTASSIUM SERPL-MCNC: 5 MMOL/L — SIGNIFICANT CHANGE UP (ref 3.5–5)
POTASSIUM SERPL-SCNC: 5 MMOL/L — SIGNIFICANT CHANGE UP (ref 3.5–5)
PROT SERPL-MCNC: 6.9 G/DL — SIGNIFICANT CHANGE UP (ref 6–8)
PROT UR-MCNC: SIGNIFICANT CHANGE UP
RBC # BLD: 3.76 M/UL — LOW (ref 4.7–6.1)
RBC # FLD: 15.1 % — HIGH (ref 11.5–14.5)
RBC CASTS # UR COMP ASSIST: 84 /HPF — HIGH (ref 0–4)
SODIUM SERPL-SCNC: 139 MMOL/L — SIGNIFICANT CHANGE UP (ref 135–146)
SP GR SPEC: 1.03 — SIGNIFICANT CHANGE UP (ref 1.01–1.03)
UROBILINOGEN FLD QL: SIGNIFICANT CHANGE UP
WBC # BLD: 12.83 K/UL — HIGH (ref 4.8–10.8)
WBC # FLD AUTO: 12.83 K/UL — HIGH (ref 4.8–10.8)
WBC UR QL: 6 /HPF — HIGH (ref 0–5)

## 2022-08-11 PROCEDURE — 99285 EMERGENCY DEPT VISIT HI MDM: CPT

## 2022-08-11 PROCEDURE — 74177 CT ABD & PELVIS W/CONTRAST: CPT | Mod: 26,MA

## 2022-08-11 PROCEDURE — 93010 ELECTROCARDIOGRAM REPORT: CPT

## 2022-08-11 RX ORDER — ONDANSETRON 8 MG/1
4 TABLET, FILM COATED ORAL ONCE
Refills: 0 | Status: COMPLETED | OUTPATIENT
Start: 2022-08-11 | End: 2022-08-11

## 2022-08-11 RX ORDER — MORPHINE SULFATE 50 MG/1
4 CAPSULE, EXTENDED RELEASE ORAL ONCE
Refills: 0 | Status: DISCONTINUED | OUTPATIENT
Start: 2022-08-11 | End: 2022-08-11

## 2022-08-11 RX ORDER — SODIUM CHLORIDE 9 MG/ML
1000 INJECTION, SOLUTION INTRAVENOUS ONCE
Refills: 0 | Status: COMPLETED | OUTPATIENT
Start: 2022-08-11 | End: 2022-08-11

## 2022-08-11 RX ORDER — KETOROLAC TROMETHAMINE 30 MG/ML
15 SYRINGE (ML) INJECTION ONCE
Refills: 0 | Status: DISCONTINUED | OUTPATIENT
Start: 2022-08-11 | End: 2022-08-11

## 2022-08-11 RX ORDER — CEFTRIAXONE 500 MG/1
1000 INJECTION, POWDER, FOR SOLUTION INTRAMUSCULAR; INTRAVENOUS ONCE
Refills: 0 | Status: DISCONTINUED | OUTPATIENT
Start: 2022-08-11 | End: 2022-08-11

## 2022-08-11 RX ORDER — DIATRIZOATE MEGLUMINE 180 MG/ML
30 INJECTION, SOLUTION INTRAVESICAL ONCE
Refills: 0 | Status: COMPLETED | OUTPATIENT
Start: 2022-08-11 | End: 2022-08-11

## 2022-08-11 RX ADMIN — SODIUM CHLORIDE 1000 MILLILITER(S): 9 INJECTION, SOLUTION INTRAVENOUS at 16:58

## 2022-08-11 RX ADMIN — MORPHINE SULFATE 4 MILLIGRAM(S): 50 CAPSULE, EXTENDED RELEASE ORAL at 16:57

## 2022-08-11 RX ADMIN — MORPHINE SULFATE 4 MILLIGRAM(S): 50 CAPSULE, EXTENDED RELEASE ORAL at 18:00

## 2022-08-11 RX ADMIN — SODIUM CHLORIDE 1000 MILLILITER(S): 9 INJECTION, SOLUTION INTRAVENOUS at 18:40

## 2022-08-11 RX ADMIN — DIATRIZOATE MEGLUMINE 30 MILLILITER(S): 180 INJECTION, SOLUTION INTRAVESICAL at 16:57

## 2022-08-11 RX ADMIN — ONDANSETRON 4 MILLIGRAM(S): 8 TABLET, FILM COATED ORAL at 16:57

## 2022-08-11 RX ADMIN — SODIUM CHLORIDE 1000 MILLILITER(S): 9 INJECTION, SOLUTION INTRAVENOUS at 18:12

## 2022-08-11 NOTE — ED PROVIDER NOTE - PROVIDER TOKENS
PROVIDER:[TOKEN:[[id number]],FOLLOWUP:[7-10 Days]],PROVIDER:[TOKEN:[[id number]],FOLLOWUP:[7-10 Days]]

## 2022-08-11 NOTE — ED ADULT NURSE NOTE - CAS DISCH BELONGINGS RETURNED
Physical Therapy Initial Examination/Evaluation  March 4, 2022    Yoni Hernandez is a 50 year old male referred to physical therapy by Miranda Huston MD for treatment of B shoulder pain GH OA and RC tendinopathy with Precautions/Restrictions/MD instructions E&T    Therapist Impression:   Yoni presents to physical therapy with symptoms consistent with the above diagnosis.  Our treatment focus will be on scapular and rotator cuff stabilization/strengthening and posture re-education.    NEXT: TPR    Subjective:  DOI/onset: December 2021 DOS: none  Acute Injury or Gradual Onset?: Gradual injury over time  Mechanism of Injury: overuse?  Related PMH: None Previous Treatment: CS injection, massage Effect of prior treatment: good  Imaging: x-ray  Chief Complaint/Functional Limitations:   Reaching OH and behind back and see below in therapy evaluation codes   Pain: rest 0 /10, activity 3-9/10 Location: Lateral shoulder Frequency: Intermittent Described as: aching and sharp Alleviated by: Ibuprofen in the past Progression of Symptoms: Unchanged Time of day when pain is worse: Activity related and Position related  Sleeping: Previously was losing sleep but is improved   Occupation: Musician [employer]  Job duties: repetitive tasks  Current HEP/exercise regimen: [place] [employer]  Patient's goals are see chief complaints     Other pertinent PMH/Red Flags: Depression, High Blood Pressure   Barriers at home/work: None as reported by patient  Pertinent Surgical History: Fistulotomy  Medications: Anti-depressants and High blood pressure  General health as reported by patient: good  Return to MD:  prn    SHOULDER EXAMINATION  Handedness: NA    STATIC POSTURE  Forward head: trivial   Rounded shoulders:moderate  Shoulder internally rotated: moderate     SHOULDER RANGE OF MOTION  AROM Flexion Abduction ER Base Ext/IR or hand behind back angle   Left wnl wnl  Ant Drift: none 50 L2   Right wnl wnl pain  Ant Drift: none, shrug 45 L2   Pain:  yes    PROM Flexion Abd 90-90 ER 90-90 IR Scap Stabilized Horizontal Adduction   Left 105 GH na  80 15 pain 15   Right 105 GH na 70 10 pain 15   GH indicates pure glenohumeral ROM    SHOULDER STRENGTH  MMT Right Left   Flexion 4+/5 P 5-/5   Abduction 4+/5 P 5-/5   ER 5-/5 5-/5   IR 5/5 5/5   P= pain    SPECIAL TESTS    Assessment/Plan:  Patient is a 50 year old male with both sides shoulder complaints.    Patient has the following significant findings with corresponding treatment plan.                Diagnosis 1:  B shoulder pain GH OA and RC tendinopathy  Pain -  hot/cold therapy, manual therapy, splint/taping/bracing/orthotics, self management, education and home program  Decreased ROM/flexibility - manual therapy and therapeutic exercise  Decreased strength - therapeutic exercise and therapeutic activities  Impaired muscle performance - neuro re-education  Impaired posture - neuro re-education    Therapy Evaluation Codes:   Cumulative Therapy Evaluation is: Low complexity.    Previous and current functional limitations:  (See Goal Flow Sheet for this information)    Short term and Long term goals: (See Goal Flow Sheet for this information)     Communication ability:  Patient appears to be able to clearly communicate and understand verbal and written communication and follow directions correctly.  Treatment Explanation - The following has been discussed with the patient:   RX ordered/plan of care  Anticipated outcomes  Possible risks and side effects  This patient would benefit from PT intervention to resume normal activities.   Rehab potential is good.    Frequency:  1 X week, once daily  Duration:  for 3 weeks tapering to 2 X a month over 6 weeks  Discharge Plan:  Achieve all LTG.  Independent in home treatment program.  Reach maximal therapeutic benefit.    Please refer to the daily flowsheet for treatment today, total treatment time and time spent performing 1:1 timed codes.        Not applicable

## 2022-08-11 NOTE — ED PROVIDER NOTE - NSFOLLOWUPINSTRUCTIONS_ED_ALL_ED_FT
Kidney Stones  ImageKidney stones (urolithiasis) are solid, rock-like deposits that form inside of the organs that make urine (kidneys). A kidney stone may form in a kidney and move into the bladder, where it can cause intense pain and block the flow of urine. Kidney stones are created when high levels of certain minerals are found in the urine. They are usually passed through urination, but in some cases, medical treatment may be needed to remove them.    What are the causes?  Kidney stones may be caused by:    A condition in which certain glands produce too much parathyroid hormone (primary hyperparathyroidism), which causes too much calcium buildup in the blood.  Buildup of uric acid crystals in the bladder (hyperuricosuria). Uric acid is a chemical that the body produces when you eat certain foods. It usually exits the body in the urine.  Narrowing (stricture) of one or both of the tubes that drain urine from the kidneys to the bladder (ureters).  A kidney blockage that is present at birth (congenital obstruction).  Past surgery on the kidney or the ureters, such as gastric bypass surgery.    What increases the risk?  The following factors make you more likely to develop kidney stones:    Having had a kidney stone in the past.  Having a family history of kidney stones.  Not drinking enough water.  Eating a diet that is high in protein, salt (sodium), or sugar.  Being overweight or obese.    What are the signs or symptoms?  Symptoms of a kidney stone may include:    Nausea.  Vomiting.  Blood in the urine (hematuria).  Pain in the side of the abdomen, right below the ribs (flank pain). Pain usually spreads (radiates) to the groin.  Needing to urinate frequently or urgently.    How is this diagnosed?  This condition may be diagnosed based on:    Your medical history.  A physical exam.  Blood tests.  Urine tests.  CT scan.  Abdominal X-ray.  A procedure to examine the inside of the bladder (cystoscopy).    How is this treated?  Treatment for kidney stones depends on the size, location, and makeup of the stones. Treatment may involve:    Analyzing your urine before and after you pass the stone through urination.  Being monitored at the hospital until you pass the stone through urination.  Increasing your fluid intake and decreasing the amount of calcium and protein in your diet.  A procedure to break up kidney stones in the bladder using:    A focused beam of light (laser therapy).  Shock waves (extracorporeal shock wave lithotripsy).    Surgery to remove kidney stones. This may be needed if you have severe pain or have stones that block your urinary tract.    Follow these instructions at home:  Eating and drinking     Drink enough fluid to keep your urine clear or pale yellow. This will help you to pass the kidney stone.  If directed, change your diet. This may include:    Limiting how much sodium you eat.  Eating more fruits and vegetables.  Limiting how much meat, poultry, fish, and eggs you eat.    Follow instructions from your health care provider about eating or drinking restrictions.  General instructions     Collect urine samples as told by your health care provider. You may need to collect a urine sample:    24 hours after you pass the stone.  8­–12 weeks after passing the kidney stone, and every 6–12 months after that.    Strain your urine every time you urinate, for as long as directed. Use the strainer that your health care provider recommends.  Do not throw out the kidney stone after passing it. Keep the stone so it can be tested by your health care provider. Testing the makeup of your kidney stone may help prevent you from getting kidney stones in the future.  Take over-the-counter and prescription medicines only as told by your health care provider.  Keep all follow-up visits as told by your health care provider. This is important. You may need follow-up X-rays or ultrasounds to make sure that your stone has passed.  How is this prevented?  ImageTo prevent another kidney stone:    Drink enough fluid to keep your urine clear or pale yellow. This is the best way to prevent kidney stones.  Eat a healthy diet and follow recommendations from your health care provider about foods to avoid. You may be instructed to eat a low-protein diet. Recommendations vary depending on the type of kidney stone that you have.  Maintain a healthy weight.    Contact a health care provider if:  You have pain that gets worse or does not get better with medicine.  Get help right away if:  You have a fever or chills.  You develop severe pain.  You develop new abdominal pain.  You faint.  You are unable to urinate.    Constipation    Constipation is when a person has fewer than three bowel movements a week, has difficulty having a bowel movement, or has stools that are dry, hard, or larger than normal. Other symptoms can include abdominal pain or bloating. As people grow older, constipation is more common. A low-fiber diet, not taking in enough fluids, and taking certain medicines, including opioid painkillers, may make constipation worse. Treatment varies but may include dietary modifications (more fiber-rich foods), lifestyle modifications, and possible medications.     SEEK IMMEDIATE MEDICAL CARE IF YOU HAVE ANY OF THE FOLLOWING SYMPTOMS: bright red blood in your stool, constipation for longer than 4 days, abdominal or rectal pain, unexplained weight loss, or inability to pass gas.

## 2022-08-11 NOTE — ED PROVIDER NOTE - CLINICAL SUMMARY MEDICAL DECISION MAKING FREE TEXT BOX
patient endorsed to me by Dr. Nguyen.   75 yo M w/ hx as documented p/w abd pain and constipation. labs reviewed. imaging concerning for obstructive left kidney stone. ua noted with no overt UTI. Urology consulted appreciated- stable for outpatient follow up. patient constipated however improved after enema. follow up with urology encouraged. return precautions discussed in detail with patient.

## 2022-08-11 NOTE — ED PROVIDER NOTE - NS ED ATTENDING STATEMENT MOD
This was a shared visit with the MARK. I reviewed and verified the documentation and independently performed the documented:

## 2022-08-11 NOTE — ED PROVIDER NOTE - CARE PROVIDER_API CALL
Cadence Maciel)  Urology  [address] [place] [address], [address]  [place] [address]  Phone: [phone]  Fax: [phone]  Follow Up Time: 7-10 Days    Richard Hahn ()  Gastroenterology  [address] [place] [id number]  Phone: [phone]  Fax: [phone]  Follow Up Time: 7-10 Days

## 2022-08-11 NOTE — ED PROVIDER NOTE - OBJECTIVE STATEMENT
77 y/o male with a PMH of HTN, DM, BPH, and spinal surgery presents to the ED for evaluation of diffuse abdominal pain associated with nausea the began around 3AM. pt reports he has not passed gas or had a bowel movement in four and a half days, and usually goes daily. pt reports he used milk of magnesia and was unable to have a bowel movement. pt follows MARILYNN Hahn and had a routine endoscopy a month ago. pt has a colonoscopy scheduled for October 2022. pt spoke with GI today and was advised to visit the ED. pt denies fever, chills, vomiting, diarrhea, hx of abdominal surgeries, chest pain, sob, numbness, tingling, rashes, or recent trauma.

## 2022-08-11 NOTE — ED PROVIDER NOTE - NS ED ROS FT
CONST: No fever, chills or bodyaches  EYES: No pain, redness, drainage or visual changes.  ENT: No ear pain or discharge, nasal discharge or congestion. No sore throat  CARD: No chest pain, palpitations  RESP: No SOB, cough, hemoptysis. No hx of asthma or COPD  GI: (+) abdominal pain, and nausea. No diarrhea or vomiting.   : No urinary symptoms  MS: No joint pain, back pain or extremity pain/injury  SKIN: No rashes  NEURO: No headache, dizziness, paresthesias or LOC

## 2022-08-11 NOTE — ED PROVIDER NOTE - ATTENDING APP SHARED VISIT CONTRIBUTION OF CARE
75 yo m with pmh of bph, dm, htn, sent to ED by GI dr. Lopez for r/o obstruction.  pt says has not had bm x 4 days.  not passing gas.  started with abd pain last night.  no fever, no chills.  mild nausea, but no vomiting.  no cp, no sob.  no fever, no chills.  called dr. lopez today who told him to go to ED.  exam: nad, ncat, perrl, eomi, mmm, rrr, ctab, abd soft, diffusely ttp, mildly distended, not tympanic imp: pt with abd pain and no bm x 4 days.  r/o obstruction, labs, ct, pain control

## 2022-08-11 NOTE — ED PROVIDER NOTE - PATIENT PORTAL LINK FT
You can access the FollowMyHealth Patient Portal offered by [facility] by registering at the following website: [email or web address]. By joining [employer]’s FollowMyHealth portal, you will also be able to view your health information using other applications (apps) compatible with our system.

## 2022-08-11 NOTE — ED PROVIDER NOTE - PHYSICAL EXAMINATION
Physical Exam    Vital Signs: I have reviewed the initial vital signs.  Constitutional: well-nourished, appears stated age, no acute distress  Eyes: Conjunctiva pink, Sclera clear  Cardiovascular: S1 and S2, regular rate, regular rhythm, well-perfused extremities, radial pulses equal and 2+ b/l.   Respiratory: unlabored respiratory effort, clear to auscultation bilaterally no wheezing, rales and rhonchi. pt is speaking full sentences. no accessory muscle use.   Gastrointestinal: soft, (+) no bowel sound in the left upper and lower quadrants, diffuse abdominal tenderness, nondistended abdomen, no pulsatile mass, normal bowl sounds, no rebound, no guarding,  Musculoskeletal: FROM of b/l upper and lower extremities.   Integumentary: warm, dry, no rash  Neurologic: awake, alert, cranial nerves II-XII grossly intact, extremities’ motor and sensory functions grossly intact.   Psychiatric: appropriate mood, appropriate affect

## 2022-08-11 NOTE — ED PROVIDER NOTE - PROGRESS NOTE DETAILS
FF: pt given an enema and had a bowel movement in the ed. pt abdomen soft and nontender. pt requesting a dose of toradol before he is discharged for the kidney stone. discussed radiology and lab results with pt. pt advised of return precautions dsicussed at bedside. pt was already aware of adrenal gland mass. agreeable to dc. f/u with urology and gi.

## 2022-08-12 RX ORDER — KETOROLAC TROMETHAMINE 30 MG/ML
15 SYRINGE (ML) INJECTION ONCE
Refills: 0 | Status: DISCONTINUED | OUTPATIENT
Start: 2022-08-12 | End: 2022-08-12

## 2022-08-12 RX ORDER — KETOROLAC TROMETHAMINE 30 MG/ML
1 SYRINGE (ML) INJECTION
Qty: 20 | Refills: 0
Start: 2022-08-12 | End: 2022-08-16

## 2022-08-12 RX ORDER — TAMSULOSIN HYDROCHLORIDE 0.4 MG/1
1 CAPSULE ORAL
Qty: 7 | Refills: 0
Start: 2022-08-12 | End: 2022-08-18

## 2022-08-12 RX ADMIN — Medication 15 MILLIGRAM(S): at 01:03

## 2022-08-12 RX ADMIN — Medication 15 MILLIGRAM(S): at 01:35

## 2022-08-12 RX ADMIN — Medication 1 ENEMA: at 01:03

## 2022-08-12 NOTE — CONSULT NOTE ADULT - ASSESSMENT
75 y/o male with obstructing L distal ureteral calculus and mild hydro  - Pain control  - Flomax  - Antiemetic  - Encourage increased hydration  - Encourage increased ambulation  - d/c home and f/u with Dr. Maciel as o/p early next week  - Return to ED if fever >101.3, intractable pain/vomiting  - Pt understands and agrees with plan  - Case d/w Dr. Maciel

## 2022-08-12 NOTE — CONSULT NOTE ADULT - SUBJECTIVE AND OBJECTIVE BOX
HPI:  75 y/o male with a PMH of HTN, DM, BPH, and spinal surgery presents to the ED. Presents to ED with diffuse abdominal pain. He also states he has had L flank pain for the past month. Denies any fevers, chills. Admits to nausea but no vomiting. Has been constipated for the past 5 days. Denies any hematuria, dysuria or difficulty voiding but is having increased frequency.    REVIEW OF SYSTEMS   [x] A ten-point review of systems was otherwise negative except as noted.    MEDICATIONS  (STANDING):  saline laxative (FLEET) Rectal Enema 1 Enema Rectal Once    MEDICATIONS  (PRN):      Allergies    No Known Allergies    Intolerances    SOCIAL HISTORY: No illicit drug use    FAMILY HISTORY:      Vital Signs Last 24 Hrs  T(C): 36.1 (11 Aug 2022 23:22), Max: 36.1 (11 Aug 2022 23:22)  T(F): 97 (11 Aug 2022 23:22), Max: 97 (11 Aug 2022 23:22)  HR: 75 (11 Aug 2022 23:22) (75 - 84)  BP: 130/60 (11 Aug 2022 23:22) (130/60 - 130/66)  RR: 19 (11 Aug 2022 23:22) (19 - 20)  SpO2: 97% (11 Aug 2022 23:22) (97% - 98%)    Parameters below as of 11 Aug 2022 23:22  Patient On (Oxygen Delivery Method): room air        PHYSICAL EXAM:    Constitutional: NAD, well-developed, awake/alert  HEENT: EOMI  Neck: no pain  Back: No CVA tenderness B/L  Respiratory: No accessory respiratory muscle use  Abd: Soft, NT/ND, bladder non palpable, no suprapubic tenderness  Extremities: no edema  Neurological: A/O x 3  Psychiatric: Normal mood, normal affect  Skin: No obvious rashes      I&O's Summary      LABS:                        12.3   12.83 )-----------( 281      ( 11 Aug 2022 17:12 )             36.6     08-11    139  |  98  |  20  ----------------------------<  189<H>  5.0   |  31  |  1.3    Ca    10.1      11 Aug 2022 17:12    TPro  6.9  /  Alb  4.2  /  TBili  0.6  /  DBili  x   /  AST  17  /  ALT  12  /  AlkPhos  69        Urinalysis Basic - ( 11 Aug 2022 20:54 )    Color: Light Yellow / Appearance: Clear / S.026 / pH: x  Gluc: x / Ketone: Negative  / Bili: Negative / Urobili: <2 mg/dL   Blood: x / Protein: Trace / Nitrite: Negative   Leuk Esterase: Negative / RBC: 84 /HPF / WBC 6 /HPF   Sq Epi: x / Non Sq Epi: 1 /HPF / Bacteria: Negative            RADIOLOGY & ADDITIONAL STUDIES:  < from: CT Abdomen and Pelvis w/ Oral Cont and w/ IV Cont (22 @ 19:55) >    ACC: [id number] EXAM:  CT ABDOMEN AND PELVIS OC IC                          PROCEDURE DATE:  2022          INTERPRETATION:  REASON FOR EXAM / CLINICAL STATEMENT:  Abdominal pain.   constipation x 4 days. Evaluate for SBO.  WBC 12.83    PMHx of HTN, DM,   BPH, and spinal surgery    TECHNIQUE:  Contiguous axial CT images were obtained from the diaphragms   through the pubic symphysis with IV contrast.  Reformatted images in the   coronal and sagittal planes were acquired.      COMPARISON CT: CT scan of the abdomen and pelvis dated 3/12/2021    OTHER STUDIES USED FOR CORRELATION: None.      TUBES AND LINES: None.    LOWER CHEST: A small loculated pleural effusion is noted at the left lung   base. Small hiatus hernia. No pericardial effusion.    HEPATIC: The liver is normal in size with no evidence of solid mass or   bile duct dilatation. The portal vein is patent. The hepatic veins are   opacified.    BILIARY: Status post cholecystectomy.    SPLEEN: Unremarkable.    PANCREAS: The pancreas is normal in size and configuration. No evidence   of mass or pancreatitis.    ADRENAL GLANDS: Stable large soft tissue mass arising in the right   adrenal gland with foci of fat and calcification. The left adrenal gland   is unremarkable.    KIDNEYS: There is mild delayed right renal enhancement. There is moderate   right sided hydronephrosis with perinephric stranding and fluid. There is   moderate dilatation of the left ureter to the level of a 6.8 x 6.4 x7.7   mm calculus (1289 HU) approximately 3 cm proximal to the left   ureterovesical junction.Left renal cysts and subcentimeter hypodense   cortical lesions too small to characterize.    No evidence of right hydronephrosis, calcified stones, or solid mass.   Stable 1.6 cm fat-containing right cortical mass, consistent with an AML.   This is an indeterminate 6 mm cortical nodule projecting lateral aspect   of the midpole of the right kidney.      ABDOMINOPELVIC NODES: Unremarkable.    PELVIC ORGANS: Prostatic enlargement with calcifications. No evidence of   pelvic mass, lymphadenopathy, or fluid collection.    BLADDER: Unremarkable.    PERITONEUM/MESENTERY/BOWEL: The oral contrast has passed through the   small bowel and has just reached the cecum, without evidence of   obstruction.    A moderate amount of fluid and gas is seen throughout the colon from the   level of the cecum through the distal descending colon. No evidence of   obstruction.    Lipomatosis of the ileocecal valve is noted.    No evidence of bowel obstruction, colitis, inflammatory process, or   ascites. No pneumoperitoneum.  The appendix is normal in appearance.    BONES/SOFT TISSUES: Degenerative and post-surgical changes of the spine   are noted. The cortical screws and fixating rods noted between L2 and S1.    OTHER: Aortoiliac calcifications are noted with no evidence of abdominal   aortic aneurysm.      IMPRESSION:    1. There is moderate right sided hydronephrosis with perinephric   stranding and fluid. There is moderate dilatation of the left ureter to   the level of a 6.8 x 6.4 x7.7 mm calculus (1289 HU) approximately 3 cm   proximal to the left ureterovesical junction.    2. The oral contrast has passed through the small bowel and has just   reached the cecum, without evidence of obstruction.    3. A moderate amount of fluid and gas is seen throughout the colon from   the level of the cecum through the distal descending colon. No evidence   of obstruction.    4. Stable large soft tissue mass arising in the right adrenal gland with  foci of fat and calcification.    --- End of Report ---            GIOVANY UP MD; Attending Interventional Radiologist  This document has been electronically signed. Aug 11 2022  9:23PM    < end of copied text >

## 2022-08-17 ENCOUNTER — INPATIENT (INPATIENT)
Facility: HOSPITAL | Age: 77
LOS: 0 days | Discharge: HOME | End: 2022-08-17
Attending: UROLOGY | Admitting: UROLOGY

## 2022-08-17 ENCOUNTER — TRANSCRIPTION ENCOUNTER (OUTPATIENT)
Age: 77
End: 2022-08-17

## 2022-08-17 VITALS
TEMPERATURE: 97 F | HEIGHT: 69 IN | HEART RATE: 88 BPM | WEIGHT: 195.11 LBS | OXYGEN SATURATION: 99 % | RESPIRATION RATE: 18 BRPM | DIASTOLIC BLOOD PRESSURE: 84 MMHG | SYSTOLIC BLOOD PRESSURE: 187 MMHG

## 2022-08-17 VITALS
TEMPERATURE: 96 F | HEART RATE: 78 BPM | DIASTOLIC BLOOD PRESSURE: 76 MMHG | RESPIRATION RATE: 16 BRPM | SYSTOLIC BLOOD PRESSURE: 166 MMHG

## 2022-08-17 DIAGNOSIS — Z98.890 OTHER SPECIFIED POSTPROCEDURAL STATES: Chronic | ICD-10-CM

## 2022-08-17 DIAGNOSIS — Z98.1 ARTHRODESIS STATUS: Chronic | ICD-10-CM

## 2022-08-17 LAB
ALBUMIN SERPL ELPH-MCNC: 4.3 G/DL — SIGNIFICANT CHANGE UP (ref 3.5–5.2)
ALP SERPL-CCNC: 63 U/L — SIGNIFICANT CHANGE UP (ref 30–115)
ALT FLD-CCNC: 12 U/L — SIGNIFICANT CHANGE UP (ref 0–41)
ANION GAP SERPL CALC-SCNC: 10 MMOL/L — SIGNIFICANT CHANGE UP (ref 7–14)
APPEARANCE UR: CLEAR — SIGNIFICANT CHANGE UP
APTT BLD: 34 SEC — SIGNIFICANT CHANGE UP (ref 27–39.2)
AST SERPL-CCNC: 13 U/L — SIGNIFICANT CHANGE UP (ref 0–41)
BACTERIA # UR AUTO: ABNORMAL
BASOPHILS # BLD AUTO: 0.09 K/UL — SIGNIFICANT CHANGE UP (ref 0–0.2)
BASOPHILS NFR BLD AUTO: 1 % — SIGNIFICANT CHANGE UP (ref 0–1)
BILIRUB DIRECT SERPL-MCNC: 0.2 MG/DL — SIGNIFICANT CHANGE UP (ref 0–0.3)
BILIRUB INDIRECT FLD-MCNC: 0.7 MG/DL — SIGNIFICANT CHANGE UP (ref 0.2–1.2)
BILIRUB SERPL-MCNC: 0.9 MG/DL — SIGNIFICANT CHANGE UP (ref 0.2–1.2)
BILIRUB UR-MCNC: NEGATIVE — SIGNIFICANT CHANGE UP
BLD GP AB SCN SERPL QL: SIGNIFICANT CHANGE UP
BUN SERPL-MCNC: 14 MG/DL — SIGNIFICANT CHANGE UP (ref 10–20)
CALCIUM SERPL-MCNC: 9.4 MG/DL — SIGNIFICANT CHANGE UP (ref 8.5–10.1)
CHLORIDE SERPL-SCNC: 101 MMOL/L — SIGNIFICANT CHANGE UP (ref 98–110)
CO2 SERPL-SCNC: 27 MMOL/L — SIGNIFICANT CHANGE UP (ref 17–32)
COLOR SPEC: YELLOW — SIGNIFICANT CHANGE UP
CREAT SERPL-MCNC: 0.9 MG/DL — SIGNIFICANT CHANGE UP (ref 0.7–1.5)
DIFF PNL FLD: ABNORMAL
EGFR: 89 ML/MIN/1.73M2 — SIGNIFICANT CHANGE UP
EOSINOPHIL # BLD AUTO: 0.29 K/UL — SIGNIFICANT CHANGE UP (ref 0–0.7)
EOSINOPHIL NFR BLD AUTO: 3.2 % — SIGNIFICANT CHANGE UP (ref 0–8)
GLUCOSE BLDC GLUCOMTR-MCNC: 159 MG/DL — HIGH (ref 70–99)
GLUCOSE BLDC GLUCOMTR-MCNC: 189 MG/DL — HIGH (ref 70–99)
GLUCOSE SERPL-MCNC: 196 MG/DL — HIGH (ref 70–99)
GLUCOSE UR QL: NEGATIVE MG/DL — SIGNIFICANT CHANGE UP
HCT VFR BLD CALC: 36.3 % — LOW (ref 42–52)
HGB BLD-MCNC: 11.9 G/DL — LOW (ref 14–18)
IMM GRANULOCYTES NFR BLD AUTO: 0.4 % — HIGH (ref 0.1–0.3)
INR BLD: 1.26 RATIO — SIGNIFICANT CHANGE UP (ref 0.65–1.3)
KETONES UR-MCNC: NEGATIVE — SIGNIFICANT CHANGE UP
LACTATE SERPL-SCNC: 1.1 MMOL/L — SIGNIFICANT CHANGE UP (ref 0.7–2)
LEUKOCYTE ESTERASE UR-ACNC: ABNORMAL
LIDOCAIN IGE QN: 9 U/L — SIGNIFICANT CHANGE UP (ref 7–60)
LYMPHOCYTES # BLD AUTO: 2.32 K/UL — SIGNIFICANT CHANGE UP (ref 1.2–3.4)
LYMPHOCYTES # BLD AUTO: 25.2 % — SIGNIFICANT CHANGE UP (ref 20.5–51.1)
MCHC RBC-ENTMCNC: 32.8 G/DL — SIGNIFICANT CHANGE UP (ref 32–37)
MCHC RBC-ENTMCNC: 33 PG — HIGH (ref 27–31)
MCV RBC AUTO: 100.6 FL — HIGH (ref 80–94)
MONOCYTES # BLD AUTO: 1.22 K/UL — HIGH (ref 0.1–0.6)
MONOCYTES NFR BLD AUTO: 13.3 % — HIGH (ref 1.7–9.3)
NEUTROPHILS # BLD AUTO: 5.23 K/UL — SIGNIFICANT CHANGE UP (ref 1.4–6.5)
NEUTROPHILS NFR BLD AUTO: 56.9 % — SIGNIFICANT CHANGE UP (ref 42.2–75.2)
NITRITE UR-MCNC: NEGATIVE — SIGNIFICANT CHANGE UP
NRBC # BLD: 0 /100 WBCS — SIGNIFICANT CHANGE UP (ref 0–0)
PH UR: 7 — SIGNIFICANT CHANGE UP (ref 5–8)
PLATELET # BLD AUTO: 251 K/UL — SIGNIFICANT CHANGE UP (ref 130–400)
POTASSIUM SERPL-MCNC: 4.2 MMOL/L — SIGNIFICANT CHANGE UP (ref 3.5–5)
POTASSIUM SERPL-SCNC: 4.2 MMOL/L — SIGNIFICANT CHANGE UP (ref 3.5–5)
PROT SERPL-MCNC: 6.7 G/DL — SIGNIFICANT CHANGE UP (ref 6–8)
PROT UR-MCNC: ABNORMAL MG/DL
PROTHROM AB SERPL-ACNC: 14.5 SEC — HIGH (ref 9.95–12.87)
RBC # BLD: 3.61 M/UL — LOW (ref 4.7–6.1)
RBC # FLD: 14.9 % — HIGH (ref 11.5–14.5)
RBC CASTS # UR COMP ASSIST: SIGNIFICANT CHANGE UP /HPF
SARS-COV-2 RNA SPEC QL NAA+PROBE: SIGNIFICANT CHANGE UP
SODIUM SERPL-SCNC: 138 MMOL/L — SIGNIFICANT CHANGE UP (ref 135–146)
SP GR SPEC: 1.02 — SIGNIFICANT CHANGE UP (ref 1.01–1.03)
UROBILINOGEN FLD QL: 0.2 MG/DL — SIGNIFICANT CHANGE UP
WBC # BLD: 9.19 K/UL — SIGNIFICANT CHANGE UP (ref 4.8–10.8)
WBC # FLD AUTO: 9.19 K/UL — SIGNIFICANT CHANGE UP (ref 4.8–10.8)
WBC UR QL: SIGNIFICANT CHANGE UP /HPF

## 2022-08-17 PROCEDURE — 93010 ELECTROCARDIOGRAM REPORT: CPT

## 2022-08-17 PROCEDURE — 99053 MED SERV 10PM-8AM 24 HR FAC: CPT

## 2022-08-17 PROCEDURE — 74018 RADEX ABDOMEN 1 VIEW: CPT | Mod: 26

## 2022-08-17 PROCEDURE — 52344 CYSTO/URETERO STRICTURE TX: CPT | Mod: 59,LT

## 2022-08-17 PROCEDURE — 99285 EMERGENCY DEPT VISIT HI MDM: CPT

## 2022-08-17 PROCEDURE — 52352 CYSTOURETERO W/STONE REMOVE: CPT | Mod: LT

## 2022-08-17 RX ORDER — ATORVASTATIN CALCIUM 80 MG/1
0 TABLET, FILM COATED ORAL
Qty: 0 | Refills: 0 | DISCHARGE

## 2022-08-17 RX ORDER — TAMSULOSIN HYDROCHLORIDE 0.4 MG/1
1 CAPSULE ORAL
Qty: 30 | Refills: 0
Start: 2022-08-17 | End: 2022-09-15

## 2022-08-17 RX ORDER — SODIUM CHLORIDE 9 MG/ML
1000 INJECTION INTRAMUSCULAR; INTRAVENOUS; SUBCUTANEOUS
Refills: 0 | Status: DISCONTINUED | OUTPATIENT
Start: 2022-08-17 | End: 2022-08-17

## 2022-08-17 RX ORDER — HYDROMORPHONE HYDROCHLORIDE 2 MG/ML
1 INJECTION INTRAMUSCULAR; INTRAVENOUS; SUBCUTANEOUS
Refills: 0 | Status: DISCONTINUED | OUTPATIENT
Start: 2022-08-17 | End: 2022-08-17

## 2022-08-17 RX ORDER — AMLODIPINE BESYLATE 2.5 MG/1
0 TABLET ORAL
Qty: 0 | Refills: 0 | DISCHARGE

## 2022-08-17 RX ORDER — SODIUM CHLORIDE 9 MG/ML
1000 INJECTION INTRAMUSCULAR; INTRAVENOUS; SUBCUTANEOUS ONCE
Refills: 0 | Status: COMPLETED | OUTPATIENT
Start: 2022-08-17 | End: 2022-08-17

## 2022-08-17 RX ORDER — MORPHINE SULFATE 50 MG/1
2 CAPSULE, EXTENDED RELEASE ORAL EVERY 4 HOURS
Refills: 0 | Status: DISCONTINUED | OUTPATIENT
Start: 2022-08-17 | End: 2022-08-17

## 2022-08-17 RX ORDER — ZOLPIDEM TARTRATE 10 MG/1
0 TABLET ORAL
Qty: 0 | Refills: 0 | DISCHARGE

## 2022-08-17 RX ORDER — PHENAZOPYRIDINE HCL 100 MG
1 TABLET ORAL
Qty: 0 | Refills: 0 | DISCHARGE
Start: 2022-08-17

## 2022-08-17 RX ORDER — METFORMIN HYDROCHLORIDE 850 MG/1
0 TABLET ORAL
Qty: 0 | Refills: 0 | DISCHARGE

## 2022-08-17 RX ORDER — PHENAZOPYRIDINE HCL 100 MG
100 TABLET ORAL ONCE
Refills: 0 | Status: COMPLETED | OUTPATIENT
Start: 2022-08-17 | End: 2022-08-17

## 2022-08-17 RX ORDER — ONDANSETRON 8 MG/1
4 TABLET, FILM COATED ORAL ONCE
Refills: 0 | Status: DISCONTINUED | OUTPATIENT
Start: 2022-08-17 | End: 2022-08-17

## 2022-08-17 RX ORDER — MORPHINE SULFATE 50 MG/1
4 CAPSULE, EXTENDED RELEASE ORAL ONCE
Refills: 0 | Status: DISCONTINUED | OUTPATIENT
Start: 2022-08-17 | End: 2022-08-17

## 2022-08-17 RX ORDER — SODIUM CHLORIDE 9 MG/ML
1000 INJECTION, SOLUTION INTRAVENOUS
Refills: 0 | Status: DISCONTINUED | OUTPATIENT
Start: 2022-08-17 | End: 2022-08-17

## 2022-08-17 RX ORDER — HYDROMORPHONE HYDROCHLORIDE 2 MG/ML
0.5 INJECTION INTRAMUSCULAR; INTRAVENOUS; SUBCUTANEOUS
Refills: 0 | Status: DISCONTINUED | OUTPATIENT
Start: 2022-08-17 | End: 2022-08-17

## 2022-08-17 RX ORDER — ONDANSETRON 8 MG/1
4 TABLET, FILM COATED ORAL ONCE
Refills: 0 | Status: COMPLETED | OUTPATIENT
Start: 2022-08-17 | End: 2022-08-17

## 2022-08-17 RX ORDER — CEFPODOXIME PROXETIL 100 MG
1 TABLET ORAL
Qty: 10 | Refills: 0
Start: 2022-08-17 | End: 2022-08-21

## 2022-08-17 RX ORDER — PHENAZOPYRIDINE HCL 100 MG
1 TABLET ORAL
Qty: 6 | Refills: 0
Start: 2022-08-17 | End: 2022-08-18

## 2022-08-17 RX ADMIN — MORPHINE SULFATE 4 MILLIGRAM(S): 50 CAPSULE, EXTENDED RELEASE ORAL at 07:32

## 2022-08-17 RX ADMIN — Medication 100 MILLIGRAM(S): at 20:12

## 2022-08-17 RX ADMIN — SODIUM CHLORIDE 1000 MILLILITER(S): 9 INJECTION INTRAMUSCULAR; INTRAVENOUS; SUBCUTANEOUS at 07:31

## 2022-08-17 RX ADMIN — HYDROMORPHONE HYDROCHLORIDE 1 MILLIGRAM(S): 2 INJECTION INTRAMUSCULAR; INTRAVENOUS; SUBCUTANEOUS at 17:19

## 2022-08-17 RX ADMIN — ONDANSETRON 4 MILLIGRAM(S): 8 TABLET, FILM COATED ORAL at 07:32

## 2022-08-17 NOTE — DISCHARGE NOTE PROVIDER - NSDCCPTREATMENT_GEN_ALL_CORE_FT
PRINCIPAL PROCEDURE  Procedure: Cystoscopy with ureteroscopy, laser lithotripsy, and insertion of ureteral stent  Findings and Treatment:

## 2022-08-17 NOTE — DISCHARGE NOTE PROVIDER - NSDCFUADDINST_GEN_ALL_CORE_FT
Drink plenty of fluids  to help pass the stones and resolve the blood in urine  Continue taking the antibiotic YOU ALREADY HAVE AT HOME (amoxicillin-clavulanate)  If you develop chills or fever - take your temperature. If  100.5 or greater, please call Dr. Maciel  Follow up in 2 weeks for stent removal

## 2022-08-17 NOTE — PATIENT PROFILE ADULT - FALL HARM RISK - UNIVERSAL INTERVENTIONS
Bed in lowest position, wheels locked, appropriate side rails in place/Call bell, personal items and telephone in reach/Instruct patient to call for assistance before getting out of bed or chair/Non-slip footwear when patient is out of bed/[place] to call system/Physically safe environment - no spills, clutter or unnecessary equipment/Purposeful Proactive Rounding/Room/bathroom lighting operational, light cord in reach

## 2022-08-17 NOTE — H&P ADULT - ASSESSMENT
77 y/o male with obstructing L distal ureteral calculus and mild hydro  - now with persistent left renal colic 75 y/o male with obstructing L distal ureteral calculus and mild hydro  - now with persistent left renal colic due to  obstructing left ureteral stone     - For OR today  - keep NPO, iV fluids, pain mgmt

## 2022-08-17 NOTE — H&P ADULT - NSICDXPASTSURGICALHX_GEN_ALL_CORE_FT
PAST SURGICAL HISTORY:  History of lumbar surgery     S/P cervical spinal fusion     S/P trigger finger release     Status post shoulder surgery

## 2022-08-17 NOTE — PRE-ANESTHESIA EVALUATION ADULT - MALLAMPATI CLASS
Billing Type: Third-Party Bill Bill For Surgical Tray: no Class II - visualization of the soft palate, fauces, and uvula Expected Date Of Service: 10/27/2021

## 2022-08-17 NOTE — DISCHARGE NOTE PROVIDER - NSDCCPCAREPLAN_GEN_ALL_CORE_FT
PRINCIPAL DISCHARGE DIAGNOSIS  Diagnosis: Left ureteral stone  Assessment and Plan of Treatment:

## 2022-08-17 NOTE — DISCHARGE NOTE PROVIDER - NSDCMRMEDTOKEN_GEN_ALL_CORE_FT
Ambien: 10  orally once a day (at bedtime)  amLODIPine: 5  orally once a day  amoxicillin-clavulanate 875 mg-125 mg oral tablet: 1 tab(s) orally 2 times a day   enalapril: 20  orally 2 times a day  Flomax 0.4 mg oral capsule: 1 cap(s) orally once a day   Lipitor: 20  orally once a day (at bedtime)  metFORMIN: 500  orally 2 times a day  Pyridium 100 mg oral tablet: 1 tab(s) orally 3 times a day, As Needed for urinary burning

## 2022-08-17 NOTE — H&P ADULT - NSHPPHYSICALEXAM_GEN_ALL_CORE
GEN; Nontoxic appearing  lungs: cta  cvs: s1s2  abd: soft, ND, +mild left flank/CVA tenderness  ext: no LE edema noted

## 2022-08-17 NOTE — DISCHARGE NOTE NURSING/CASE MANAGEMENT/SOCIAL WORK - PATIENT PORTAL LINK FT
You can access the FollowMyHealth Patient Portal offered by [facility] by registering at the following website: [email or web address]. By joining [facility]’s FollowMyHealth portal, you will also be able to view your health information using other applications (apps) compatible with our system.

## 2022-08-17 NOTE — H&P ADULT - TIME BILLING
patient was seen and evaluated  persistent flank pain  stone still at the UVJ suspect impacted stone  will plan ureteroscopy, laser lithoripsy and stent placement

## 2022-08-17 NOTE — ED PROVIDER NOTE - CLINICAL SUMMARY MEDICAL DECISION MAKING FREE TEXT BOX
we have consulted urology and have obtained labs patient improved with iv pain medication and iv zofran iv fluids I will admit for further workup per urology

## 2022-08-17 NOTE — ED ADULT NURSE NOTE - NSICDXPASTMEDICALHX_GEN_ALL_CORE_FT
PAST MEDICAL HISTORY:  HTN (hypertension)     Kidney stones      PAST MEDICAL HISTORY:  High cholesterol     HTN (hypertension)     Kidney stones

## 2022-08-17 NOTE — CHART NOTE - NSCHARTNOTEFT_GEN_A_CORE
PACU ANESTHESIA ADMISSION NOTE      Procedure: Cystoscopy , Left ureteroscopy ,Laser lithotripsy , placement of bilateral stent  Post op diagnosis:  Left     ____  Intubated  TV:______       Rate: ______      FiO2: ______    __x__  Patent Airway    __x__  Full return of protective reflexes    __x__  Full recovery from anesthesia / back to baseline status    Vitals:  T(C): 36.1 (08-17-22 @ 13:20), Max: 36.2 (08-17-22 @ 06:44)  HR: 60 (08-17-22 @ 13:20) (60 - 88)  BP: 159/74 (08-17-22 @ 13:20) (159/74 - 187/84)  RR: 18 (08-17-22 @ 13:20) (16 - 18)  SpO2: 99% (08-17-22 @ 13:20) (99% - 99%)    Mental Status:  _x___ Awake   ___x__ Alert   _____ Drowsy   _____ Sedated    Nausea/Vomiting:  ____ NO  ___x___Yes,   See Post - Op Orders          Pain Scale (0-10):  _____    Treatment: ____ None    __x__ See Post - Op/PCA Orders    Post - Operative Fluids:   ____ Oral   __x__ See Post - Op Orders    Plan: Discharge:   _x___Home       _____Floor     _____Critical Care    _____  Other:_________________    Comments: uneventful anesthesia course no complications. VItals stable. Pt transferred to PACU

## 2022-08-17 NOTE — DISCHARGE NOTE PROVIDER - HOSPITAL COURSE
77 y/o male with obstructing L distal ureteral calculus and mild hydro  - now with persistent left renal colic due to  obstructing left ureteral stone    - al labs WNL   - For OR today  - keep NPO, iV fluids, pain mgmt  - s/p left litho/stent 8/17   - remained afebrile & D/C'd home same day w/flomax, pyridium; he had augmentin at home, instructed to continue

## 2022-08-17 NOTE — BRIEF OPERATIVE NOTE - OPERATION/FINDINGS
impacted LEFT UVJ stone with ureteric stricture - tortuous ureter requiring ureteroscopy for stent placement

## 2022-08-17 NOTE — H&P ADULT - NSHPLABSRESULTS_GEN_ALL_CORE
Vital Signs Last 24 Hrs  T(C): 36.2 (17 Aug 2022 06:44), Max: 36.2 (17 Aug 2022 06:44)  T(F): 97.2 (17 Aug 2022 06:44), Max: 97.2 (17 Aug 2022 06:44)  HR: 88 (17 Aug 2022 06:44) (88 - 88)  BP: 187/84 (17 Aug 2022 06:44) (187/84 - 187/84)  BP(mean): --  RR: 18 (17 Aug 2022 06:44) (18 - 18)  SpO2: 99% (17 Aug 2022 06:44) (99% - 99%)    Parameters below as of 17 Aug 2022 06:  Patient On (Oxygen Delivery Method): room air    Labs:  CAPILLARY BLOOD GLUCOSE                              11.9   9.19  )-----------( 251      ( 17 Aug 2022 07:25 )             36.3       Auto Neutrophil %: 56.9 % (22 @ 07:25)  Auto Immature Granulocyte %: 0.4 % (22 @ 07:25)        138  |  101  |  14  ----------------------------<  196<H>  4.2   |  27  |  0.9      Calcium, Total Serum: 9.4 mg/dL (22 @ 07:25)      LFTs:             6.7  | 0.9  | 13       ------------------[63      ( 17 Aug 2022 07:25 )  4.3  | 0.2  | 12          Lipase:9      Amylase:x         Lactate, Blood: 1.1 mmol/L (22 @ 07:25)      Coags:     14.50  ----< 1.26    ( 17 Aug 2022 07:25 )     34.0            Urinalysis Basic - ( 17 Aug 2022 07:25 )    Color: Yellow / Appearance: Clear / S.020 / pH: x  Gluc: x / Ketone: Negative  / Bili: Negative / Urobili: 0.2 mg/dL   Blood: x / Protein: Trace mg/dL / Nitrite: Negative   Leuk Esterase: Trace / RBC: 1-2 /HPF / WBC 3-5 /HPF   Sq Epi: x / Non Sq Epi: x / Bacteria: Few Vital Signs Last 24 Hrs  T(C): 36.2 (17 Aug 2022 06:44), Max: 36.2 (17 Aug 2022 06:44)  T(F): 97.2 (17 Aug 2022 06:44), Max: 97.2 (17 Aug 2022 06:44)  HR: 88 (17 Aug 2022 06:44) (88 - 88)  BP: 187/84 (17 Aug 2022 06:44) (187/84 - 187/84)  BP(mean): --  RR: 18 (17 Aug 2022 06:44) (18 - 18)  SpO2: 99% (17 Aug 2022 06:44) (99% - 99%)    Parameters below as of 17 Aug 2022 06:44  Patient On (Oxygen Delivery Method): room air    Labs:                          11.9   9.19  )-----------( 251      ( 17 Aug 2022 07:25 )             36.3       Auto Neutrophil %: 56.9 % (22 @ 07:25)  Auto Immature Granulocyte %: 0.4 % (22 @ 07:25)        138  |  101  |  14  ----------------------------<  196<H>  4.2   |  27  |  0.9      Calcium, Total Serum: 9.4 mg/dL (22 @ 07:25)      LFTs:             6.7  | 0.9  | 13       ------------------[63      ( 17 Aug 2022 07:25 )  4.3  | 0.2  | 12          Lipase:9      Amylase:x         Lactate, Blood: 1.1 mmol/L (22 @ 07:25)      Coags:     14.50  ----< 1.26    ( 17 Aug 2022 07:25 )     34.0        Urinalysis Basic - ( 17 Aug 2022 07:25 )    Color: Yellow / Appearance: Clear / S.020 / pH: x  Gluc: x / Ketone: Negative  / Bili: Negative / Urobili: 0.2 mg/dL   Blood: x / Protein: Trace mg/dL / Nitrite: Negative   Leuk Esterase: Trace / RBC: 1-2 /HPF / WBC 3-5 /HPF   Sq Epi: x / Non Sq Epi: x / Bacteria: Few    RECENT CULTURES:   @ 20:54 Clean Catch Clean Catch (Midstream) Citrobacter koseri Citrobacter koseri   10,000 - 49,000 CFU/mL Citrobacter koseri

## 2022-08-17 NOTE — ED PROVIDER NOTE - NS ED ROS FT
Eyes:  No visual changes, eye pain or discharge.  ENMT:  No hearing changes, pain, discharge or infections. No neck pain or stiffness.  Cardiac:  No chest pain, SOB or edema. No chest pain with exertion.  Respiratory:  No cough or respiratory distress. No hemoptysis. No history of asthma or RAD.  GI:  + flank pain + n/v + No  diarrhea  :  No dysuria, frequency or burning.  MS:  No myalgia, muscle weakness, joint pain or back pain.  Neuro:  No headache or weakness.  No LOC.  Skin:  No skin rash.   Endocrine: + DM No history of thyroid disease   Except as documented in the HPI,  all other systems are negative.

## 2022-08-17 NOTE — ED PROVIDER NOTE - ATTENDING APP SHARED VISIT CONTRIBUTION OF CARE
I was present for and supervised the key and critical aspects of the procedures performed during the care of the patient. Patient is a 76-year-old male with a past medical history of hypertension, diabetes, hyperlipidemia diagnosed with a left UVJ stone approximately 5 days ago here for urology evaluation after pain worsened over the past 2 days.  Patient admits to associated nausea and vomiting.  Patient denies chest pain, shortness of breath, fever, chills. he denies any diarrhea he was sent in for operative intervention   on exam he is improved nc/at perrla eomi oropharynx clear cta b/l, rrr s1s2 noted abd-soft nt nd bs+ no guarding no rebound ext from   a/p- we have consulted urology and have obtained labs patient improved with iv pain medication and iv zofran iv fluids I will admit for further workup per urology

## 2022-08-17 NOTE — BRIEF OPERATIVE NOTE - NSICDXBRIEFPROCEDURE_GEN_ALL_CORE_FT
PROCEDURES:  Cystoscopy with ureteroscopy, laser lithotripsy, and insertion of ureteral stent 17-Aug-2022 15:43:31  Ryan Maciel T

## 2022-08-17 NOTE — DISCHARGE NOTE PROVIDER - CARE PROVIDER_API CALL
Cadence Maciel)  Urology  [address] [place] [address], [address]  [place] [address]  Phone: [phone]  Fax: [phone]  Follow Up Time: 2 weeks

## 2022-08-17 NOTE — BRIEF OPERATIVE NOTE - NSICDXBRIEFPOSTOP_GEN_ALL_CORE_FT
POST-OP DIAGNOSIS:  Left ureteral stone 17-Aug-2022 15:44:12  Ryan Maciel  Ureteral stricture, left 17-Aug-2022 15:44:20  Ryan Maciel

## 2022-08-17 NOTE — ED ADULT TRIAGE NOTE - CHIEF COMPLAINT QUOTE
LT Sided flank pain. dx w/ kidney stone 2 days ago. [place] Dr[place]

## 2022-08-17 NOTE — ED PROVIDER NOTE - OBJECTIVE STATEMENT
Patient is a 76-year-old male with a past medical history of hypertension, diabetes, hyperlipidemia diagnosed with a left UVJ stone approximately 5 days ago here for urology evaluation after pain worsened over the past 2 days.  Patient admits to associated nausea and vomiting.  Patient denies chest pain, shortness of breath, fever, chills.

## 2022-08-17 NOTE — ED ADULT NURSE NOTE - CHIEF COMPLAINT QUOTE
LT Sided flank pain. dx w/ kidney stone 5 days ago. Told by Dr. [place]

## 2022-08-17 NOTE — H&P ADULT - HISTORY OF PRESENT ILLNESS
75 y/o male with a PMH of HTN, DM, BPH, spinal surgery and recently diagnosed left distal ureteral stone (6x7x8 mm) on 8/12/22  (D/C'd home from ER at that time ) - now presents for persistent left flank pain, associated with N/V/C.  Denies fevers, hematuria.

## 2022-08-18 LAB
CULTURE RESULTS: NO GROWTH — SIGNIFICANT CHANGE UP
HCV AB S/CO SERPL IA: 0.03 COI — SIGNIFICANT CHANGE UP
HCV AB SERPL-IMP: SIGNIFICANT CHANGE UP
SPECIMEN SOURCE: SIGNIFICANT CHANGE UP

## 2022-08-19 PROBLEM — I10 ESSENTIAL (PRIMARY) HYPERTENSION: Chronic | Status: ACTIVE | Noted: 2022-08-17

## 2022-08-19 PROBLEM — N20.0 CALCULUS OF KIDNEY: Chronic | Status: ACTIVE | Noted: 2022-08-17

## 2022-08-19 PROBLEM — E78.00 PURE HYPERCHOLESTEROLEMIA, UNSPECIFIED: Chronic | Status: ACTIVE | Noted: 2022-08-17

## 2022-08-22 DIAGNOSIS — N13.2 HYDRONEPHROSIS WITH RENAL AND URETERAL CALCULOUS OBSTRUCTION: ICD-10-CM

## 2022-08-22 DIAGNOSIS — I10 ESSENTIAL (PRIMARY) HYPERTENSION: ICD-10-CM

## 2022-08-22 DIAGNOSIS — E11.9 TYPE 2 DIABETES MELLITUS WITHOUT COMPLICATIONS: ICD-10-CM

## 2022-08-22 DIAGNOSIS — E78.5 HYPERLIPIDEMIA, UNSPECIFIED: ICD-10-CM

## 2022-08-22 DIAGNOSIS — N20.1 CALCULUS OF URETER: ICD-10-CM

## 2022-08-22 DIAGNOSIS — N40.0 BENIGN PROSTATIC HYPERPLASIA WITHOUT LOWER URINARY TRACT SYMPTOMS: ICD-10-CM

## 2022-08-28 ENCOUNTER — LABORATORY RESULT (OUTPATIENT)
Age: 77
End: 2022-08-28

## 2022-08-28 ENCOUNTER — RX RENEWAL (OUTPATIENT)
Age: 77
End: 2022-08-28

## 2022-08-29 ENCOUNTER — APPOINTMENT (OUTPATIENT)
Dept: UROLOGY | Facility: CLINIC | Age: 77
End: 2022-08-29

## 2022-08-29 DIAGNOSIS — N20.1 CALCULUS OF URETER: ICD-10-CM

## 2022-08-29 PROCEDURE — 99214 OFFICE O/P EST MOD 30 MIN: CPT

## 2022-08-30 PROBLEM — N20.1 URETERAL STONE: Status: ACTIVE | Noted: 2022-08-30

## 2022-08-30 NOTE — ASSESSMENT
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

## 2022-08-31 ENCOUNTER — OUTPATIENT (OUTPATIENT)
Dept: OUTPATIENT SERVICES | Facility: HOSPITAL | Age: 77
LOS: 1 days | Discharge: HOME | End: 2022-08-31

## 2022-08-31 DIAGNOSIS — Z98.1 ARTHRODESIS STATUS: Chronic | ICD-10-CM

## 2022-08-31 DIAGNOSIS — Z98.890 OTHER SPECIFIED POSTPROCEDURAL STATES: Chronic | ICD-10-CM

## 2022-08-31 DIAGNOSIS — N20.1 CALCULUS OF URETER: ICD-10-CM

## 2022-08-31 PROCEDURE — 74019 RADEX ABDOMEN 2 VIEWS: CPT | Mod: 26

## 2022-09-01 LAB
APPEARANCE: ABNORMAL
BILIRUBIN URINE: NEGATIVE
BLOOD URINE: ABNORMAL
COLOR: YELLOW
GLUCOSE QUALITATIVE U: NEGATIVE
KETONES URINE: NEGATIVE
LEUKOCYTE ESTERASE URINE: ABNORMAL
NITRITE URINE: POSITIVE
PH URINE: 6
PROTEIN URINE: ABNORMAL
SPECIFIC GRAVITY URINE: 1.02
UROBILINOGEN URINE: NORMAL

## 2022-09-02 ENCOUNTER — NON-APPOINTMENT (OUTPATIENT)
Age: 77
End: 2022-09-02

## 2022-09-02 RX ORDER — SULFAMETHOXAZOLE AND TRIMETHOPRIM 800; 160 MG/1; MG/1
800-160 TABLET ORAL
Qty: 14 | Refills: 0 | Status: ACTIVE | COMMUNITY
Start: 2022-09-02 | End: 1900-01-01

## 2022-09-12 ENCOUNTER — LABORATORY RESULT (OUTPATIENT)
Age: 77
End: 2022-09-12

## 2022-09-14 ENCOUNTER — TRANSCRIPTION ENCOUNTER (OUTPATIENT)
Age: 77
End: 2022-09-14

## 2022-09-14 ENCOUNTER — OUTPATIENT (OUTPATIENT)
Dept: OUTPATIENT SERVICES | Facility: HOSPITAL | Age: 77
LOS: 1 days | Discharge: HOME | End: 2022-09-14

## 2022-09-14 ENCOUNTER — APPOINTMENT (OUTPATIENT)
Dept: UROLOGY | Facility: HOSPITAL | Age: 77
End: 2022-09-14

## 2022-09-14 VITALS — DIASTOLIC BLOOD PRESSURE: 75 MMHG | RESPIRATION RATE: 15 BRPM | SYSTOLIC BLOOD PRESSURE: 161 MMHG | HEART RATE: 55 BPM

## 2022-09-14 VITALS
HEART RATE: 74 BPM | TEMPERATURE: 97 F | SYSTOLIC BLOOD PRESSURE: 140 MMHG | WEIGHT: 190.04 LBS | RESPIRATION RATE: 18 BRPM | OXYGEN SATURATION: 97 % | HEIGHT: 68 IN | DIASTOLIC BLOOD PRESSURE: 68 MMHG

## 2022-09-14 DIAGNOSIS — Z98.890 OTHER SPECIFIED POSTPROCEDURAL STATES: Chronic | ICD-10-CM

## 2022-09-14 DIAGNOSIS — Z98.1 ARTHRODESIS STATUS: Chronic | ICD-10-CM

## 2022-09-14 LAB — GLUCOSE BLDC GLUCOMTR-MCNC: 168 MG/DL — HIGH (ref 70–99)

## 2022-09-14 PROCEDURE — 52344 CYSTO/URETERO STRICTURE TX: CPT | Mod: LT

## 2022-09-14 PROCEDURE — 52332 CYSTOSCOPY AND TREATMENT: CPT | Mod: LT

## 2022-09-14 RX ORDER — PHENAZOPYRIDINE HCL 100 MG
200 TABLET ORAL ONCE
Refills: 0 | Status: COMPLETED | OUTPATIENT
Start: 2022-09-14 | End: 2022-09-14

## 2022-09-14 RX ORDER — CEFUROXIME AXETIL 250 MG/1
250 TABLET ORAL
Qty: 20 | Refills: 0 | Status: ACTIVE | COMMUNITY
Start: 2022-09-14 | End: 1900-01-01

## 2022-09-14 RX ORDER — PHENAZOPYRIDINE 100 MG/1
100 TABLET, FILM COATED ORAL 3 TIMES DAILY
Qty: 15 | Refills: 0 | Status: ACTIVE | COMMUNITY
Start: 2022-09-14 | End: 1900-01-01

## 2022-09-14 RX ADMIN — Medication 200 MILLIGRAM(S): at 15:33

## 2022-09-14 NOTE — ASU PATIENT PROFILE, ADULT - FALL HARM RISK - HARM RISK INTERVENTIONS

## 2022-09-14 NOTE — BRIEF OPERATIVE NOTE - NSICDXBRIEFPROCEDURE_GEN_ALL_CORE_FT
PROCEDURES:  Ureteroscopy, rigid, with stent insertion 14-Sep-2022 15:00:11  Ryan Maciel  Cystoscopic removal of bladder stone 14-Sep-2022 15:00:20  Ryan Maciel

## 2022-09-14 NOTE — BRIEF OPERATIVE NOTE - OPERATION/FINDINGS
no stones seen in the ureter  no evidence of stricture  residual edema from prior stent  bladder stones - removed cystoscopically

## 2022-09-14 NOTE — CHART NOTE - NSCHARTNOTEFT_GEN_A_CORE
PACU ANESTHESIA ADMISSION NOTE      Procedure:   Post op diagnosis:      ____  Intubated  TV:______       Rate: ______      FiO2: ______    _x___  Patent Airway    _x___  Full return of protective reflexes    _x___  Full recovery from anesthesia / back to baseline status    Vitals:  T(C): 37  HR: 55  BP: 105/55  RR: 18   SpO2: 97%     Mental Status:  _x___ Awake   _____ Alert   _____ Drowsy   _____ Sedated    Nausea/Vomiting:  _x___  NO       ______Yes,   See Post - Op Orders         Pain Scale (0-10):  __0___    Treatment: _x___ None    ____ See Post - Op/PCA Orders    Post - Operative Fluids:   __x__ Oral   ____ See Post - Op Orders    Plan: Discharge:   _x___Home       _____Floor     _____Critical Care    _____  Other:_________________    Comments:  No anesthesia issues or complications noted.  Discharge when criteria met.

## 2022-09-15 LAB — BACTERIA UR CULT: ABNORMAL

## 2022-09-19 ENCOUNTER — APPOINTMENT (OUTPATIENT)
Dept: UROLOGY | Facility: CLINIC | Age: 77
End: 2022-09-19

## 2022-09-19 VITALS
RESPIRATION RATE: 16 BRPM | HEIGHT: 68 IN | TEMPERATURE: 97.4 F | OXYGEN SATURATION: 98 % | HEART RATE: 69 BPM | DIASTOLIC BLOOD PRESSURE: 74 MMHG | BODY MASS INDEX: 28.79 KG/M2 | SYSTOLIC BLOOD PRESSURE: 165 MMHG | WEIGHT: 190 LBS

## 2022-09-19 DIAGNOSIS — N21.0 CALCULUS IN BLADDER: ICD-10-CM

## 2022-09-19 DIAGNOSIS — N20.1 CALCULUS OF URETER: ICD-10-CM

## 2022-09-19 DIAGNOSIS — E11.9 TYPE 2 DIABETES MELLITUS WITHOUT COMPLICATIONS: ICD-10-CM

## 2022-09-19 DIAGNOSIS — Z87.442 PERSONAL HISTORY OF URINARY CALCULI: ICD-10-CM

## 2022-09-19 DIAGNOSIS — Z79.84 LONG TERM (CURRENT) USE OF ORAL HYPOGLYCEMIC DRUGS: ICD-10-CM

## 2022-09-19 DIAGNOSIS — N40.0 BENIGN PROSTATIC HYPERPLASIA WITHOUT LOWER URINARY TRACT SYMPTOMS: ICD-10-CM

## 2022-09-19 DIAGNOSIS — I10 ESSENTIAL (PRIMARY) HYPERTENSION: ICD-10-CM

## 2022-09-19 DIAGNOSIS — E78.00 PURE HYPERCHOLESTEROLEMIA, UNSPECIFIED: ICD-10-CM

## 2022-09-19 LAB — NIDUS STONE QN: SIGNIFICANT CHANGE UP

## 2022-09-19 PROCEDURE — 52310 CYSTOSCOPY AND TREATMENT: CPT

## 2022-09-21 NOTE — ED ADULT NURSE NOTE - DRUG PRE-SCREENING (DAST -1)
well developed, well nourished , in no acute distress , ambulating without difficulty , normal communication ability
Statement Selected

## 2022-10-12 ENCOUNTER — INPATIENT (INPATIENT)
Facility: HOSPITAL | Age: 77
LOS: 1 days | Discharge: HOME | End: 2022-10-14
Attending: HOSPITALIST | Admitting: HOSPITALIST

## 2022-10-12 VITALS
SYSTOLIC BLOOD PRESSURE: 150 MMHG | RESPIRATION RATE: 20 BRPM | HEART RATE: 101 BPM | TEMPERATURE: 100 F | DIASTOLIC BLOOD PRESSURE: 68 MMHG | WEIGHT: 190.04 LBS | HEIGHT: 68 IN | OXYGEN SATURATION: 95 %

## 2022-10-12 DIAGNOSIS — Z98.890 OTHER SPECIFIED POSTPROCEDURAL STATES: Chronic | ICD-10-CM

## 2022-10-12 DIAGNOSIS — Z98.1 ARTHRODESIS STATUS: Chronic | ICD-10-CM

## 2022-10-12 LAB
ALBUMIN SERPL ELPH-MCNC: 3.7 G/DL — SIGNIFICANT CHANGE UP (ref 3.5–5.2)
ALP SERPL-CCNC: 61 U/L — SIGNIFICANT CHANGE UP (ref 30–115)
ALT FLD-CCNC: 9 U/L — SIGNIFICANT CHANGE UP (ref 0–41)
ANION GAP SERPL CALC-SCNC: 13 MMOL/L — SIGNIFICANT CHANGE UP (ref 7–14)
APPEARANCE UR: ABNORMAL
APTT BLD: 31.4 SEC — SIGNIFICANT CHANGE UP (ref 27–39.2)
AST SERPL-CCNC: 11 U/L — SIGNIFICANT CHANGE UP (ref 0–41)
BACTERIA # UR AUTO: ABNORMAL /HPF
BASOPHILS # BLD AUTO: 0.05 K/UL — SIGNIFICANT CHANGE UP (ref 0–0.2)
BASOPHILS NFR BLD AUTO: 0.3 % — SIGNIFICANT CHANGE UP (ref 0–1)
BILIRUB DIRECT SERPL-MCNC: 0.3 MG/DL — SIGNIFICANT CHANGE UP (ref 0–0.3)
BILIRUB INDIRECT FLD-MCNC: 0.6 MG/DL — SIGNIFICANT CHANGE UP (ref 0.2–1.2)
BILIRUB SERPL-MCNC: 0.9 MG/DL — SIGNIFICANT CHANGE UP (ref 0.2–1.2)
BILIRUB UR-MCNC: ABNORMAL
BUN SERPL-MCNC: 12 MG/DL — SIGNIFICANT CHANGE UP (ref 10–20)
CALCIUM SERPL-MCNC: 8.9 MG/DL — SIGNIFICANT CHANGE UP (ref 8.4–10.5)
CHLORIDE SERPL-SCNC: 99 MMOL/L — SIGNIFICANT CHANGE UP (ref 98–110)
CO2 SERPL-SCNC: 25 MMOL/L — SIGNIFICANT CHANGE UP (ref 17–32)
COLOR SPEC: ABNORMAL
CREAT SERPL-MCNC: 0.9 MG/DL — SIGNIFICANT CHANGE UP (ref 0.7–1.5)
DIFF PNL FLD: ABNORMAL
EGFR: 89 ML/MIN/1.73M2 — SIGNIFICANT CHANGE UP
EOSINOPHIL # BLD AUTO: 0.11 K/UL — SIGNIFICANT CHANGE UP (ref 0–0.7)
EOSINOPHIL NFR BLD AUTO: 0.6 % — SIGNIFICANT CHANGE UP (ref 0–8)
EPI CELLS # UR: ABNORMAL /HPF
GLUCOSE BLDC GLUCOMTR-MCNC: 179 MG/DL — HIGH (ref 70–99)
GLUCOSE BLDC GLUCOMTR-MCNC: 249 MG/DL — HIGH (ref 70–99)
GLUCOSE SERPL-MCNC: 213 MG/DL — HIGH (ref 70–99)
GLUCOSE UR QL: NEGATIVE MG/DL — SIGNIFICANT CHANGE UP
HCT VFR BLD CALC: 35.5 % — LOW (ref 42–52)
HGB BLD-MCNC: 11.4 G/DL — LOW (ref 14–18)
IMM GRANULOCYTES NFR BLD AUTO: 0.5 % — HIGH (ref 0.1–0.3)
INR BLD: 1.47 RATIO — HIGH (ref 0.65–1.3)
KETONES UR-MCNC: 15
LACTATE SERPL-SCNC: 1.1 MMOL/L — SIGNIFICANT CHANGE UP (ref 0.7–2)
LEUKOCYTE ESTERASE UR-ACNC: ABNORMAL
LYMPHOCYTES # BLD AUTO: 0.66 K/UL — LOW (ref 1.2–3.4)
LYMPHOCYTES # BLD AUTO: 3.4 % — LOW (ref 20.5–51.1)
MCHC RBC-ENTMCNC: 31.6 PG — HIGH (ref 27–31)
MCHC RBC-ENTMCNC: 32.1 G/DL — SIGNIFICANT CHANGE UP (ref 32–37)
MCV RBC AUTO: 98.3 FL — HIGH (ref 80–94)
MONOCYTES # BLD AUTO: 2.09 K/UL — HIGH (ref 0.1–0.6)
MONOCYTES NFR BLD AUTO: 10.6 % — HIGH (ref 1.7–9.3)
NEUTROPHILS # BLD AUTO: 16.68 K/UL — HIGH (ref 1.4–6.5)
NEUTROPHILS NFR BLD AUTO: 84.6 % — HIGH (ref 42.2–75.2)
NITRITE UR-MCNC: POSITIVE
NRBC # BLD: 0 /100 WBCS — SIGNIFICANT CHANGE UP (ref 0–0)
PH UR: 6 — SIGNIFICANT CHANGE UP (ref 5–8)
PLATELET # BLD AUTO: 213 K/UL — SIGNIFICANT CHANGE UP (ref 130–400)
POTASSIUM SERPL-MCNC: 3.9 MMOL/L — SIGNIFICANT CHANGE UP (ref 3.5–5)
POTASSIUM SERPL-SCNC: 3.9 MMOL/L — SIGNIFICANT CHANGE UP (ref 3.5–5)
PROT SERPL-MCNC: 6.6 G/DL — SIGNIFICANT CHANGE UP (ref 6–8)
PROT UR-MCNC: >=300 MG/DL
PROTHROM AB SERPL-ACNC: 16.9 SEC — HIGH (ref 9.95–12.87)
RBC # BLD: 3.61 M/UL — LOW (ref 4.7–6.1)
RBC # FLD: 15.1 % — HIGH (ref 11.5–14.5)
RBC CASTS # UR COMP ASSIST: >50 /HPF
SARS-COV-2 RNA SPEC QL NAA+PROBE: SIGNIFICANT CHANGE UP
SODIUM SERPL-SCNC: 137 MMOL/L — SIGNIFICANT CHANGE UP (ref 135–146)
SP GR SPEC: 1.02 — SIGNIFICANT CHANGE UP (ref 1.01–1.03)
UROBILINOGEN FLD QL: 1 MG/DL
WBC # BLD: 19.69 K/UL — HIGH (ref 4.8–10.8)
WBC # FLD AUTO: 19.69 K/UL — HIGH (ref 4.8–10.8)
WBC UR QL: >50 /HPF

## 2022-10-12 PROCEDURE — 99053 MED SERV 10PM-8AM 24 HR FAC: CPT

## 2022-10-12 PROCEDURE — 99285 EMERGENCY DEPT VISIT HI MDM: CPT

## 2022-10-12 PROCEDURE — 99221 1ST HOSP IP/OBS SF/LOW 40: CPT

## 2022-10-12 PROCEDURE — 99223 1ST HOSP IP/OBS HIGH 75: CPT

## 2022-10-12 PROCEDURE — 74177 CT ABD & PELVIS W/CONTRAST: CPT | Mod: 26,MA

## 2022-10-12 PROCEDURE — 93010 ELECTROCARDIOGRAM REPORT: CPT

## 2022-10-12 RX ORDER — METFORMIN HYDROCHLORIDE 850 MG/1
500 TABLET ORAL
Qty: 0 | Refills: 0 | DISCHARGE

## 2022-10-12 RX ORDER — SODIUM CHLORIDE 9 MG/ML
1000 INJECTION, SOLUTION INTRAVENOUS
Refills: 0 | Status: DISCONTINUED | OUTPATIENT
Start: 2022-10-12 | End: 2022-10-14

## 2022-10-12 RX ORDER — ATORVASTATIN CALCIUM 80 MG/1
20 TABLET, FILM COATED ORAL AT BEDTIME
Refills: 0 | Status: DISCONTINUED | OUTPATIENT
Start: 2022-10-12 | End: 2022-10-14

## 2022-10-12 RX ORDER — CEFTRIAXONE 500 MG/1
1000 INJECTION, POWDER, FOR SOLUTION INTRAMUSCULAR; INTRAVENOUS ONCE
Refills: 0 | Status: COMPLETED | OUTPATIENT
Start: 2022-10-12 | End: 2022-10-12

## 2022-10-12 RX ORDER — PHENAZOPYRIDINE HCL 100 MG
200 TABLET ORAL ONCE
Refills: 0 | Status: COMPLETED | OUTPATIENT
Start: 2022-10-12 | End: 2022-10-12

## 2022-10-12 RX ORDER — CEFEPIME 1 G/1
1000 INJECTION, POWDER, FOR SOLUTION INTRAMUSCULAR; INTRAVENOUS EVERY 12 HOURS
Refills: 0 | Status: DISCONTINUED | OUTPATIENT
Start: 2022-10-12 | End: 2022-10-14

## 2022-10-12 RX ORDER — SODIUM CHLORIDE 9 MG/ML
1000 INJECTION INTRAMUSCULAR; INTRAVENOUS; SUBCUTANEOUS ONCE
Refills: 0 | Status: COMPLETED | OUTPATIENT
Start: 2022-10-12 | End: 2022-10-12

## 2022-10-12 RX ORDER — DEXTROSE 50 % IN WATER 50 %
12.5 SYRINGE (ML) INTRAVENOUS ONCE
Refills: 0 | Status: DISCONTINUED | OUTPATIENT
Start: 2022-10-12 | End: 2022-10-14

## 2022-10-12 RX ORDER — ACETAMINOPHEN 500 MG
650 TABLET ORAL ONCE
Refills: 0 | Status: COMPLETED | OUTPATIENT
Start: 2022-10-12 | End: 2022-10-12

## 2022-10-12 RX ORDER — INSULIN LISPRO 100/ML
VIAL (ML) SUBCUTANEOUS
Refills: 0 | Status: DISCONTINUED | OUTPATIENT
Start: 2022-10-12 | End: 2022-10-14

## 2022-10-12 RX ORDER — PHENAZOPYRIDINE HCL 100 MG
1 TABLET ORAL
Qty: 0 | Refills: 0 | DISCHARGE

## 2022-10-12 RX ORDER — DEXTROSE 50 % IN WATER 50 %
25 SYRINGE (ML) INTRAVENOUS ONCE
Refills: 0 | Status: DISCONTINUED | OUTPATIENT
Start: 2022-10-12 | End: 2022-10-14

## 2022-10-12 RX ORDER — TAMSULOSIN HYDROCHLORIDE 0.4 MG/1
0.4 CAPSULE ORAL AT BEDTIME
Refills: 0 | Status: DISCONTINUED | OUTPATIENT
Start: 2022-10-12 | End: 2022-10-14

## 2022-10-12 RX ORDER — INFLUENZA VIRUS VACCINE 15; 15; 15; 15 UG/.5ML; UG/.5ML; UG/.5ML; UG/.5ML
0.7 SUSPENSION INTRAMUSCULAR ONCE
Refills: 0 | Status: DISCONTINUED | OUTPATIENT
Start: 2022-10-12 | End: 2022-10-14

## 2022-10-12 RX ORDER — ONDANSETRON 8 MG/1
4 TABLET, FILM COATED ORAL EVERY 8 HOURS
Refills: 0 | Status: DISCONTINUED | OUTPATIENT
Start: 2022-10-12 | End: 2022-10-14

## 2022-10-12 RX ORDER — ZOLPIDEM TARTRATE 10 MG/1
5 TABLET ORAL AT BEDTIME
Refills: 0 | Status: DISCONTINUED | OUTPATIENT
Start: 2022-10-12 | End: 2022-10-14

## 2022-10-12 RX ORDER — ENOXAPARIN SODIUM 100 MG/ML
30 INJECTION SUBCUTANEOUS EVERY 24 HOURS
Refills: 0 | Status: DISCONTINUED | OUTPATIENT
Start: 2022-10-12 | End: 2022-10-14

## 2022-10-12 RX ORDER — AMLODIPINE BESYLATE 2.5 MG/1
5 TABLET ORAL DAILY
Refills: 0 | Status: DISCONTINUED | OUTPATIENT
Start: 2022-10-12 | End: 2022-10-14

## 2022-10-12 RX ORDER — PHENAZOPYRIDINE HCL 100 MG
100 TABLET ORAL EVERY 8 HOURS
Refills: 0 | Status: DISCONTINUED | OUTPATIENT
Start: 2022-10-12 | End: 2022-10-14

## 2022-10-12 RX ORDER — ZOLPIDEM TARTRATE 10 MG/1
10 TABLET ORAL
Qty: 0 | Refills: 0 | DISCHARGE

## 2022-10-12 RX ORDER — CEFTRIAXONE 500 MG/1
1000 INJECTION, POWDER, FOR SOLUTION INTRAMUSCULAR; INTRAVENOUS EVERY 24 HOURS
Refills: 0 | Status: DISCONTINUED | OUTPATIENT
Start: 2022-10-12 | End: 2022-10-12

## 2022-10-12 RX ORDER — PHENAZOPYRIDINE HCL 100 MG
100 TABLET ORAL EVERY 8 HOURS
Refills: 0 | Status: DISCONTINUED | OUTPATIENT
Start: 2022-10-12 | End: 2022-10-12

## 2022-10-12 RX ORDER — DEXTROSE 50 % IN WATER 50 %
15 SYRINGE (ML) INTRAVENOUS ONCE
Refills: 0 | Status: DISCONTINUED | OUTPATIENT
Start: 2022-10-12 | End: 2022-10-14

## 2022-10-12 RX ORDER — SODIUM CHLORIDE 9 MG/ML
1000 INJECTION INTRAMUSCULAR; INTRAVENOUS; SUBCUTANEOUS
Refills: 0 | Status: DISCONTINUED | OUTPATIENT
Start: 2022-10-12 | End: 2022-10-14

## 2022-10-12 RX ORDER — AMLODIPINE BESYLATE 2.5 MG/1
5 TABLET ORAL
Qty: 0 | Refills: 0 | DISCHARGE

## 2022-10-12 RX ORDER — ACETAMINOPHEN 500 MG
650 TABLET ORAL EVERY 6 HOURS
Refills: 0 | Status: DISCONTINUED | OUTPATIENT
Start: 2022-10-12 | End: 2022-10-14

## 2022-10-12 RX ORDER — GLUCAGON INJECTION, SOLUTION 0.5 MG/.1ML
1 INJECTION, SOLUTION SUBCUTANEOUS ONCE
Refills: 0 | Status: DISCONTINUED | OUTPATIENT
Start: 2022-10-12 | End: 2022-10-14

## 2022-10-12 RX ORDER — ACETAMINOPHEN 500 MG
975 TABLET ORAL ONCE
Refills: 0 | Status: COMPLETED | OUTPATIENT
Start: 2022-10-12 | End: 2022-10-12

## 2022-10-12 RX ORDER — ATORVASTATIN CALCIUM 80 MG/1
20 TABLET, FILM COATED ORAL
Qty: 0 | Refills: 0 | DISCHARGE

## 2022-10-12 RX ORDER — LANOLIN ALCOHOL/MO/W.PET/CERES
3 CREAM (GRAM) TOPICAL AT BEDTIME
Refills: 0 | Status: DISCONTINUED | OUTPATIENT
Start: 2022-10-12 | End: 2022-10-12

## 2022-10-12 RX ADMIN — Medication 20 MILLIGRAM(S): at 21:37

## 2022-10-12 RX ADMIN — Medication 100 MILLIGRAM(S): at 21:37

## 2022-10-12 RX ADMIN — SODIUM CHLORIDE 1000 MILLILITER(S): 9 INJECTION INTRAMUSCULAR; INTRAVENOUS; SUBCUTANEOUS at 06:54

## 2022-10-12 RX ADMIN — TAMSULOSIN HYDROCHLORIDE 0.4 MILLIGRAM(S): 0.4 CAPSULE ORAL at 21:38

## 2022-10-12 RX ADMIN — Medication 200 MILLIGRAM(S): at 09:53

## 2022-10-12 RX ADMIN — Medication 650 MILLIGRAM(S): at 11:14

## 2022-10-12 RX ADMIN — ATORVASTATIN CALCIUM 20 MILLIGRAM(S): 80 TABLET, FILM COATED ORAL at 21:37

## 2022-10-12 RX ADMIN — Medication 100 MILLIGRAM(S): at 17:25

## 2022-10-12 RX ADMIN — CEFTRIAXONE 100 MILLIGRAM(S): 500 INJECTION, POWDER, FOR SOLUTION INTRAMUSCULAR; INTRAVENOUS at 06:53

## 2022-10-12 RX ADMIN — Medication 975 MILLIGRAM(S): at 06:51

## 2022-10-12 RX ADMIN — SODIUM CHLORIDE 100 MILLILITER(S): 9 INJECTION INTRAMUSCULAR; INTRAVENOUS; SUBCUTANEOUS at 18:28

## 2022-10-12 RX ADMIN — ZOLPIDEM TARTRATE 5 MILLIGRAM(S): 10 TABLET ORAL at 22:09

## 2022-10-12 RX ADMIN — CEFEPIME 100 MILLIGRAM(S): 1 INJECTION, POWDER, FOR SOLUTION INTRAMUSCULAR; INTRAVENOUS at 18:29

## 2022-10-12 NOTE — H&P ADULT - NSICDXPASTSURGICALHX_GEN_ALL_CORE_FT
PAST SURGICAL HISTORY:  H/O lithotripsy     History of lumbar surgery     S/P cervical spinal fusion     S/P trigger finger release     Status post shoulder surgery     Status post urinary system surgery 9/14

## 2022-10-12 NOTE — H&P ADULT - ASSESSMENT
Pt is a 77 yo M PMH of BPH, DM, HTN, and HLD presents to ED complaining of dysuria, urinary retention, hematuria, and fever. Pt is a 77 yo M PMH of BPH, DM, HTN, and HLD admitted for cystitis     #Cystitis   - Pt is a 77 yo M PMH of BPH, DM, HTN, and HLD admitted for cystitis     #Urosepsis   #Urine retention secondary to obstruction in setting of UTI and BPH   #Hx Lithotripsy with stent placement 9/14, stent removed   -WBC 19.69  -UA positive  -Lanza placed for retention    -CT A/P: 1.  Partially decompressed bladder containing Lanza catheter however there is markedly thickened bladder wall with perivesicular inflammation highly suspicious for cystitis/urinary tract infection.  2.  Previously seen left hydroureteronephrosis and distal ureteral obstructing calculus have resolved.  -Continue Rocephin  -F/u Urology   -F/u Blood and urine c/u   -Kidney fx normal   -Continue pyridium   -IVF     #Hx kidney stones   -Resolved on CT A/P. No hydronephrosis     #Hx of adrenal mass  -Outpatient f/u     #BPH  -Continue flomax    #DM  -POCT 213  -Sliding scale insulin    #HLD  -Continue statin     #HTN  -Continue amlodipine, enalapril     #DVT ppx: Lovenox 40 mg subq daily  #GI ppx: None needed  #Diet: DASH  #Activity: As tolerated     D/w Dr. Hirsch    Pt is a 77 yo M PMH of BPH, DM, HTN, and HLD admitted for cystitis     #Urosepsis   #Urine retention secondary to obstruction in setting of UTI and BPH   #Hx Lithotripsy with stent placement 9/14, stent removed   -WBC 19.69  -UA positive  -Lanza placed for retention    -CT A/P: 1.  Partially decompressed bladder containing Lanza catheter however there is markedly thickened bladder wall with perivesicular inflammation highly suspicious for cystitis/urinary tract infection.  2.  Previously seen left hydroureteronephrosis and distal ureteral obstructing calculus have resolved.  -Continue Rocephin  -F/u Urology   -F/u Blood and urine c/u   -Kidney fx normal   -Continue pyridium   -IVF     #Hx kidney stones   -Resolved on CT A/P. No hydronephrosis     #Hx of adrenal mass  -Outpatient f/u     #BPH  -Continue flomax    #DM  -POCT 213  -Sliding scale insulin    #HLD  -Continue statin     #HTN  -Continue amlodipine, enalapril     #DVT ppx: Lovenox   #GI ppx: None needed  #Diet: DASH  #Activity: As tolerated     D/w Dr. Hirsch

## 2022-10-12 NOTE — H&P ADULT - HISTORY OF PRESENT ILLNESS
Pt is a 77 yo M PMH of BPH, DM, HTN, and HLD presents to ED complaining of dysuria, urinary retention, hematuria, and fever. Pt s/p lithotripsy w/ stent placement on 9/14 (stent removed). Pt notes that urinary symptoms (dysuria, increased frequency, R flank/groin pain, hematuria) persisted from surgery over the past few days. Last night he noticed increasing urinary symptoms, as well fever, chills, and weakness. Denies nausea, chest pain, SOB, and LOC. Pt is a 77 yo M PMH of BPH, DM, HTN, and HLD presents to ED complaining of dysuria, urinary retention, hematuria, and fever. Pt s/p lithotripsy w/ stent placement on 9/14 (stent removed). Pt notes that urinary symptoms (dysuria, increased frequency, R flank/groin pain, hematuria) persisted from surgery over the past few days. Last night he noticed increasing urinary symptoms, as well as fever, chills, and weakness, which prompted ER visit. Denies nausea, chest pain, SOB, leg pain/edema    In the ER pt was given Rocephin for Cystitis

## 2022-10-12 NOTE — H&P ADULT - NSHPPHYSICALEXAM_GEN_ALL_CORE
VITALS:  T(C): 38.4 (10-12-22 @ 11:01), Max: 38.4 (10-12-22 @ 11:01)  HR: 77 (10-12-22 @ 07:26) (77 - 101)  BP: 110/52 (10-12-22 @ 07:26) (110/52 - 150/68)  RR: 20 (10-12-22 @ 07:26) (20 - 20)  SpO2: 96% (10-12-22 @ 07:26) (95% - 96%)    PHYSICAL EXAM:  GENERAL: NAD, well-developed, well nourished, looks stated age, non toxic appearing  HEAD:  Atraumatic, Normocephalic  EYES: EOMI, PERRLA, conjunctiva and sclera clear  NECK: Supple, No JVD, no bruits, no masses  ENMT: No tonsillar erythema, exudated or enlargement, moist mucous membranes  CHEST/LUNG: Clear to auscultation bilaterally; No rales, rhonchi or wheezing, no dullness to percussion  HEART: S1,S2 Regular rate and rhythm; No murmurs, rubs, or gallops  ABDOMEN: Soft, nondistended, no rebound tenderness; No palpable masses, no hernias, no organomegaly; Bowel sounds present and normoactive; R flank and groin tender to palpation  EXTREMITIES:  2+ peripheral pulses bilaterally and symmetrically, no clubbing, cyanosis, or edema  PSYCH: AAOx3, normal mood and affect  NEUROLOGY: non-focal, muscle strength 5/5 all extremities, DTRs 2+ symmetrically  SKIN: No rashes or lesions PHYSICAL EXAM:  Vital Signs Last 24 Hrs  T(F): 101.2 (10-12-22 @ 11:01), Max: 101.2 (10-12-22 @ 11:01)  HR: 77 (10-12-22 @ 07:26) (77 - 101)  BP: 110/52 (10-12-22 @ 07:26)  RR: 20 (10-12-22 @ 07:26) (20 - 20)  SpO2: 96% (10-12-22 @ 07:26) (95% - 96%)    Constitutional: NAD, A&O x3. Non-diaphoretic, non-toxic appearing  Eyes: PERRLA. EOMI. Sclera white.   Respiratory: +air entry, no rales, no rhonchi, no wheezes  Cardiovascular: +S1 and S2, regular rate and rhythm. No murmurs, rubs, gallops.  Gastrointestinal: +BS, soft, +suprapubic tenderness, flank pain  Extremities:  no edema, no calf tenderness  Vascular: +dorsal pedis and radial pulses, no extremity cyanosis  Neurological: sensation intact, ROM equal B/L, CN II-XII intact  Skin: no rashes, normal turgor

## 2022-10-12 NOTE — H&P ADULT - NS ATTEND AMEND GEN_ALL_CORE FT
pt seen and examined in ER  nad aao3  [id number]  ctabl  sofntnt+bs  nocce  +tapia - orange due to pyridum     #sepsis due to cystitis - will change to cefepime as pt was on abxs already prior to admission and uro procedure on sept 14th - unclear if due to procedure but less likely as it was a month ago   check ucx, bcx  #nephrolith - sp lithotrip and stent removal   #urinary retention - tapia placed in ER - TOV after treating cystitis and ambulating

## 2022-10-12 NOTE — ED ADULT NURSE NOTE - NSIMPLEMENTINTERV_GEN_ALL_ED
Implemented All Universal Safety Interventions:  [place] to call system. Call bell, personal items and telephone within reach. Instruct patient to call for assistance. Room bathroom lighting operational. Non-slip footwear when patient is off stretcher. Physically safe environment: no spills, clutter or unnecessary equipment. Stretcher in lowest position, wheels locked, appropriate side rails in place.

## 2022-10-12 NOTE — CONSULT NOTE ADULT - SUBJECTIVE AND OBJECTIVE BOX
HPI:  Pt is a 77yo M with a pmhx as below including bph on flomax, kidney stone s/p recent lithotripsy, stent and removal, removal of bladder stones, whom had acute onset of difficulty voiding last nite associated with dysuria, hematuria, and chills. As per ED tapia placed  150 cc  hematuria, cloudy, temp 100.5, wbc 19.      PAST MEDICAL & SURGICAL HISTORY:  HTN (hypertension)    Kidney stones    High cholesterol    S/P trigger finger release    S/P cervical spinal fusion    Status post shoulder surgery    History of lumbar surgery        Allergies    No Known Allergies        MEDICATIONS    See med rec    ROS:  General:	+fever, chills  Skin: no rash, ulcers  Ophthalmologic: no visual changes  ENMT:	no sore throat  Respiratory and Thorax: no cough, wheeze,  sob  Cardiovascular:	no chest pain, palpitations, dizziness  Gastrointestinal:	no nausea, vomiting, diarrhea, abd pain  Genitourinary:	+ dysuria, hematuria  Musculoskeletal:	no joint pains  Neurological:	 no speech disturbance, focal weakness, numbness  Psychiatric:	no depression, anxiety, psychosis  Hematology/Lymphatics:	no anemia  Endocrine:	no polyuria, polydipsia        Vital Signs Last 24 Hrs  T(F): 99 (12 Oct 2022 07:26), Max: 99.6 (12 Oct 2022 05:14)  HR: 77 (12 Oct 2022 07:26) (77 - 101)  BP: 110/52 (12 Oct 2022 07:26) (110/52 - 150/68)  RR: 20 (12 Oct 2022 07:26) (20 - 20)  SpO2: 96% (12 Oct 2022 07:26) (95% - 96%)    PHYSICAL EXAM:      Constitutional: A&Ox4  Respiratory: cta b/l  Cardiovascular: s1 s2 rrr  Gastrointestinal: soft nt  nd + bs no rebound or guarding  Genitourinary: no cva tenderness, tapia in place, cloudy urine, blood tinged  Extremities: normal rom, no edema, calf tenderness  Neurological:no focal deficits  Skin: no rash                            11.4   19.69 )-----------( 213      ( 12 Oct 2022 06:20 )             35.5       10-12    137  |  99  |  12  ----------------------------<  213<H>  3.9   |  25  |  0.9    Ca    8.9      12 Oct 2022 06:20    TPro  6.6  /  Alb  3.7  /  TBili  0.9  /  DBili  0.3  /  AST  11  /  ALT  9   /  AlkPhos  61  10-12      PT/INR - ( 12 Oct 2022 06:20 )   PT: 16.90 sec;   INR: 1.47 ratio         PTT - ( 12 Oct 2022 06:20 )  PTT:31.4 sec    Urinalysis Basic - ( 12 Oct 2022 05:40 )    Color: Brown / Appearance: Cloudy / S.025 / pH: x  Gluc: x / Ketone: 15  / Bili: Small / Urobili: 1.0 mg/dL   Blood: x / Protein: >=300 mg/dL / Nitrite: Positive   Leuk Esterase: Large / RBC: >50 /HPF / WBC >50 /HPF   Sq Epi: x / Non Sq Epi: Occasional /HPF / Bacteria: TNTC /HPF        < from: CT Abdomen and Pelvis w/ IV Cont (10.12.22 @ 08:24) >  IMPRESSION:  1.  Partially decompressed bladder containing Tapia catheter however   there is markedly thickened bladder wall with perivesicular inflammation   highly suspicious for cystitis/urinary tract infection.  2.  Previously seen left hydroureteronephrosis and distal ureteral   obstructing calculus have resolved.  3.  Grossly unchanged heterogeneous right adrenal mass with mixed   attenuation including fat and calcification.

## 2022-10-12 NOTE — ED PROVIDER NOTE - ATTESTATION, MLM
I have reviewed and confirmed nurses' notes for patient's medications, allergies, medical history, and surgical history.
Strong peripheral pulses

## 2022-10-12 NOTE — CONSULT NOTE ADULT - TIME BILLING
patient seen and examined  I agree with the assessment and plan as outlined    77yo M with a pmhx as below including bph on flomax, kidney stone s/p recent lithotripsy, stent and removal, removal of bladder stones, whom had acute onset of difficulty voiding last nite associated with dysuria, hematuria, and chills. As per ED tapia placed  150 cc  hematuria, cloudy, temp 100.5, wbc 19.  CT no residual stone or hydro, +cystitis.     #urosepsis  -admit  -IV abx  -f/u urine and blood cx      #urine retention secondary to obstruction in setting of UTI and hx of BPH  -c/w tapia  -c/w flomax

## 2022-10-12 NOTE — H&P ADULT - NS_MD_PANP_GEN_ALL_CORE
EKG SR no ischemic changes       1) ?CVA  -echo normal monitor on tele  - MRI/MRA shows acute CVA  - pt with documented afib no need for ILR   - pt has h/o Afib stoppped 2/2 GIB, GI and neuro on board who gave ok to restart eliquis 5 mg po BID      2) ESRD   -on HD   -f/u renal recs     3) NSVT   -normal LV function  -Toprol increased to 50mg daily    3) DVT PPX  -eliquis Attending and PA/NP shared services statement (NON-critical care):

## 2022-10-12 NOTE — ED PROVIDER NOTE - OBJECTIVE STATEMENT
76 year old male past medical history of Hypertension, HLD, renal stones with recent stent and stone removal one month ago. patient states that for the last day decrease urine out put and now states has blood in his urine and having chills. no fever/chills. no back pain.

## 2022-10-12 NOTE — ED PROVIDER NOTE - PROGRESS NOTE DETAILS
discussed case with urology, no obstructing stone on ct, will admit for uti, persistent fever, leukocytosis

## 2022-10-12 NOTE — ED PROVIDER NOTE - PHYSICAL EXAMINATION
Physical Exam    Vital Signs: I have reviewed the initial vital signs.  Constitutional: elderly, no acute distress  Eyes: Conjunctiva pink, Sclera clear, PERRLA, EOMI.  Cardiovascular: S1 and S2, regular rate, regular rhythm, well-perfused extremities, radial pulses equal and 2+  Respiratory: unlabored respiratory effort, clear to auscultation bilaterally no wheezing, rales and rhonchi  Gastrointestinal: soft, +suprapubic tenderness, no pulsatile mass, normal bowl sounds  Musculoskeletal: supple neck, no lower extremity edema, no midline tenderness  Integumentary: warm, dry, no rash  Neurologic: awake, alert, cranial nerves II-XII grossly intact, extremities’ motor and sensory functions grossly intact  Psychiatric: appropriate mood, appropriate affect

## 2022-10-12 NOTE — H&P ADULT - NSHPLABSRESULTS_GEN_ALL_CORE
LABS:             11.4   19.69 )-----------( 213      ( 12 Oct 2022 06:20 )             35.5     10-    137  |  99  |  12  ----------------------------<  213<H>  3.9   |  25  |  0.9    Ca    8.9      12 Oct 2022 06:20    TPro  6.6  /  Alb  3.7  /  TBili  0.9  /  DBili  0.3  /  AST  11  /  ALT  9   /  AlkPhos  61  10-12      URINALYSIS:   ( 12 Oct 2022 05:40 )  Color: Brown / Appearance: Cloudy / S.025 / pH: x  Gluc: x / Ketone: 15  / Bili: Small / Urobili: 1.0 mg/dL   Blood: x / Protein: >=300 mg/dL / Nitrite: Positive   Leuk Esterase: Large / RBC: >50 /HPF / WBC >50 /HPF   Sq Epi: x / Non Sq Epi: Occasional /HPF / Bacteria: TNTC /HPF      RADIOLOGY:   < CT Abdomen and Pelvis w/ IV Cont (10.12.22 @ 08:24) >    IMPRESSION:  1.  Partially decompressed bladder containing Lanza catheter however   there is markedly thickened bladder wall with perivesicular inflammation   highly suspicious for cystitis/urinary tract infection.  2.  Previously seen left hydroureteronephrosis and distal ureteral   obstructing calculus have resolved.  3.  Grossly unchanged heterogeneous right adrenal mass with mixed   attenuation including fat and calcification.

## 2022-10-12 NOTE — ED PROVIDER NOTE - CLINICAL SUMMARY MEDICAL DECISION MAKING FREE TEXT BOX
patient given iv antibiotics we have consulted urology who advised to admit to medicine for further workup

## 2022-10-12 NOTE — H&P ADULT - NSICDXPASTMEDICALHX_GEN_ALL_CORE_FT
PAST MEDICAL HISTORY:  BPH (benign prostatic hyperplasia)     Diabetes mellitus     High cholesterol     HTN (hypertension)     Kidney stones

## 2022-10-12 NOTE — H&P ADULT - NSHPSOCIALHISTORY_GEN_ALL_CORE
Domiciled: with wife and daughter at home.  Marital Status:   Tobacco: 1/2 pack/day (since 17 y/o)  Illicit drugs: pt denies use  ETOH: pt denies use

## 2022-10-12 NOTE — ED PROVIDER NOTE - ATTENDING APP SHARED VISIT CONTRIBUTION OF CARE
I reviewed and verified the documentation and  and independently performed the documented  MDM.  76yM DM (Metformin), HTN BPH,  recent  bladder stone removal , with ureteral  stnet placement  9/.14 Dr Maciel,  stent removed 9/19  presents with urinary hesitancy,  dribbling hematuria  since last night. In ED tapia placed  150 cc  hematuria, cloudy .  febrile rectally 100.5,  previous  urine culture reviewed - IV ceftriaxone started    urine culture  labs  CT ordered

## 2022-10-12 NOTE — CONSULT NOTE ADULT - ASSESSMENT
Pt is a 77yo M with a pmhx as below including bph on flomax, kidney stone s/p recent lithotripsy, stent and removal, removal of bladder stones, whom had acute onset of difficulty voiding last nite associated with dysuria, hematuria, and chills. As per ED tapia placed  150 cc  hematuria, cloudy, temp 100.5, wbc 19.  CT no residual stone or hydro, +cystitis.     #urosepsis  -admit  -IV abx  -f/u urine and blood cx      #urine retention secondary to obstruction in setting of UTI and hx of BPH  -c/w tapia  -c/w flomax  -d/w Dr Maciel    #hx of kidney stones  -resolved on current imaging, no hydro    #hx of adrenal mass  -stable, outpt f/u

## 2022-10-12 NOTE — PATIENT PROFILE ADULT - FALL HARM RISK - UNIVERSAL INTERVENTIONS
Bed in lowest position, wheels locked, appropriate side rails in place/Call bell, personal items and telephone in reach/Instruct patient to call for assistance before getting out of bed or chair/Non-slip footwear when patient is out of bed/[place] to call system/Physically safe environment - no spills, clutter or unnecessary equipment/Purposeful Proactive Rounding/Room/bathroom lighting operational, light cord in reach

## 2022-10-13 LAB
A1C WITH ESTIMATED AVERAGE GLUCOSE RESULT: 6.4 % — HIGH (ref 4–5.6)
ALBUMIN SERPL ELPH-MCNC: 3.3 G/DL — LOW (ref 3.5–5.2)
ALP SERPL-CCNC: 69 U/L — SIGNIFICANT CHANGE UP (ref 30–115)
ALT FLD-CCNC: 10 U/L — SIGNIFICANT CHANGE UP (ref 0–41)
ANION GAP SERPL CALC-SCNC: 12 MMOL/L — SIGNIFICANT CHANGE UP (ref 7–14)
AST SERPL-CCNC: 13 U/L — SIGNIFICANT CHANGE UP (ref 0–41)
BILIRUB SERPL-MCNC: 1 MG/DL — SIGNIFICANT CHANGE UP (ref 0.2–1.2)
BUN SERPL-MCNC: 12 MG/DL — SIGNIFICANT CHANGE UP (ref 10–20)
CALCIUM SERPL-MCNC: 8.5 MG/DL — SIGNIFICANT CHANGE UP (ref 8.4–10.5)
CHLORIDE SERPL-SCNC: 103 MMOL/L — SIGNIFICANT CHANGE UP (ref 98–110)
CO2 SERPL-SCNC: 25 MMOL/L — SIGNIFICANT CHANGE UP (ref 17–32)
CREAT SERPL-MCNC: 0.8 MG/DL — SIGNIFICANT CHANGE UP (ref 0.7–1.5)
EGFR: 92 ML/MIN/1.73M2 — SIGNIFICANT CHANGE UP
ESTIMATED AVERAGE GLUCOSE: 137 MG/DL — HIGH (ref 68–114)
GLUCOSE BLDC GLUCOMTR-MCNC: 157 MG/DL — HIGH (ref 70–99)
GLUCOSE BLDC GLUCOMTR-MCNC: 172 MG/DL — HIGH (ref 70–99)
GLUCOSE BLDC GLUCOMTR-MCNC: 172 MG/DL — HIGH (ref 70–99)
GLUCOSE BLDC GLUCOMTR-MCNC: 179 MG/DL — HIGH (ref 70–99)
GLUCOSE SERPL-MCNC: 149 MG/DL — HIGH (ref 70–99)
HCT VFR BLD CALC: 33.7 % — LOW (ref 42–52)
HGB BLD-MCNC: 10.7 G/DL — LOW (ref 14–18)
INR BLD: 1.62 RATIO — HIGH (ref 0.65–1.3)
MCHC RBC-ENTMCNC: 31.2 PG — HIGH (ref 27–31)
MCHC RBC-ENTMCNC: 31.8 G/DL — LOW (ref 32–37)
MCV RBC AUTO: 98.3 FL — HIGH (ref 80–94)
NRBC # BLD: 0 /100 WBCS — SIGNIFICANT CHANGE UP (ref 0–0)
PLATELET # BLD AUTO: 182 K/UL — SIGNIFICANT CHANGE UP (ref 130–400)
POTASSIUM SERPL-MCNC: 3.4 MMOL/L — LOW (ref 3.5–5)
POTASSIUM SERPL-SCNC: 3.4 MMOL/L — LOW (ref 3.5–5)
PROT SERPL-MCNC: 6 G/DL — SIGNIFICANT CHANGE UP (ref 6–8)
PROTHROM AB SERPL-ACNC: 18.7 SEC — HIGH (ref 9.95–12.87)
RBC # BLD: 3.43 M/UL — LOW (ref 4.7–6.1)
RBC # FLD: 15.5 % — HIGH (ref 11.5–14.5)
SODIUM SERPL-SCNC: 140 MMOL/L — SIGNIFICANT CHANGE UP (ref 135–146)
WBC # BLD: 22.91 K/UL — HIGH (ref 4.8–10.8)
WBC # FLD AUTO: 22.91 K/UL — HIGH (ref 4.8–10.8)

## 2022-10-13 PROCEDURE — 99232 SBSQ HOSP IP/OBS MODERATE 35: CPT

## 2022-10-13 RX ORDER — POTASSIUM CHLORIDE 20 MEQ
20 PACKET (EA) ORAL
Refills: 0 | Status: COMPLETED | OUTPATIENT
Start: 2022-10-13 | End: 2022-10-13

## 2022-10-13 RX ADMIN — CEFEPIME 100 MILLIGRAM(S): 1 INJECTION, POWDER, FOR SOLUTION INTRAMUSCULAR; INTRAVENOUS at 17:10

## 2022-10-13 RX ADMIN — Medication 1: at 07:37

## 2022-10-13 RX ADMIN — Medication 1: at 11:29

## 2022-10-13 RX ADMIN — Medication 100 MILLIGRAM(S): at 05:50

## 2022-10-13 RX ADMIN — Medication 20 MILLIEQUIVALENT(S): at 17:14

## 2022-10-13 RX ADMIN — Medication 650 MILLIGRAM(S): at 02:11

## 2022-10-13 RX ADMIN — ENOXAPARIN SODIUM 30 MILLIGRAM(S): 100 INJECTION SUBCUTANEOUS at 07:36

## 2022-10-13 RX ADMIN — Medication 650 MILLIGRAM(S): at 20:08

## 2022-10-13 RX ADMIN — Medication 20 MILLIGRAM(S): at 17:08

## 2022-10-13 RX ADMIN — SODIUM CHLORIDE 100 MILLILITER(S): 9 INJECTION INTRAMUSCULAR; INTRAVENOUS; SUBCUTANEOUS at 16:10

## 2022-10-13 RX ADMIN — Medication 20 MILLIGRAM(S): at 05:50

## 2022-10-13 RX ADMIN — Medication 1: at 16:50

## 2022-10-13 RX ADMIN — Medication 100 MILLIGRAM(S): at 13:24

## 2022-10-13 RX ADMIN — AMLODIPINE BESYLATE 5 MILLIGRAM(S): 2.5 TABLET ORAL at 05:53

## 2022-10-13 RX ADMIN — TAMSULOSIN HYDROCHLORIDE 0.4 MILLIGRAM(S): 0.4 CAPSULE ORAL at 21:15

## 2022-10-13 RX ADMIN — Medication 650 MILLIGRAM(S): at 03:11

## 2022-10-13 RX ADMIN — Medication 20 MILLIEQUIVALENT(S): at 15:59

## 2022-10-13 RX ADMIN — ZOLPIDEM TARTRATE 5 MILLIGRAM(S): 10 TABLET ORAL at 21:15

## 2022-10-13 RX ADMIN — ATORVASTATIN CALCIUM 20 MILLIGRAM(S): 80 TABLET, FILM COATED ORAL at 21:16

## 2022-10-13 RX ADMIN — CEFEPIME 100 MILLIGRAM(S): 1 INJECTION, POWDER, FOR SOLUTION INTRAMUSCULAR; INTRAVENOUS at 05:52

## 2022-10-13 RX ADMIN — Medication 100 MILLIGRAM(S): at 21:16

## 2022-10-13 NOTE — PROGRESS NOTE ADULT - SUBJECTIVE AND OBJECTIVE BOX
SUBJECTIVE:    Patient is a 76y old Male who presents with a chief complaint of UTI (12 Oct 2022 11:34)    Currently admitted to medicine with the primary diagnosis of Acute UTI       Today is hospital day 1d. This morning he is resting comfortably in bed and reports no new issues or overnight events.     ROS:   CONSTITUTIONAL: No weakness, fevers or chills   EYES/ENT: No visual changes; No vertigo or throat pain   NECK: No pain or stiffness   RESPIRATORY: No cough, wheezing, hemoptysis; No shortness of breath   CARDIOVASCULAR: No chest pain or palpitations   GASTROINTESTINAL: No abdominal or epigastric pain. No nausea, vomiting, or hematemesis; No diarrhea or constipation. No melena or hematochezia.  GENITOURINARY: No dysuria, frequency or hematuria  NEUROLOGICAL: No numbness or weakness  SKIN: No itching, rashes      PAST MEDICAL & SURGICAL HISTORY  HTN (hypertension)    Kidney stones    High cholesterol    BPH (benign prostatic hyperplasia)    Diabetes mellitus    S/P trigger finger release    S/P cervical spinal fusion    Status post shoulder surgery    History of lumbar surgery    H/O lithotripsy    Status post urinary system surgery        SOCIAL HISTORY:    ALLERGIES:  No Known Allergies    MEDICATIONS:  STANDING MEDICATIONS  amLODIPine   Tablet 5 milliGRAM(s) Oral daily  atorvastatin 20 milliGRAM(s) Oral at bedtime  cefepime   IVPB 1000 milliGRAM(s) IV Intermittent every 12 hours  dextrose 5%. 1000 milliLiter(s) IV Continuous <Continuous>  dextrose 5%. 1000 milliLiter(s) IV Continuous <Continuous>  dextrose 50% Injectable 25 Gram(s) IV Push once  dextrose 50% Injectable 12.5 Gram(s) IV Push once  dextrose 50% Injectable 25 Gram(s) IV Push once  enalapril 20 milliGRAM(s) Oral two times a day  enoxaparin Injectable 30 milliGRAM(s) SubCutaneous every 24 hours  glucagon  Injectable 1 milliGRAM(s) IntraMuscular once  influenza  Vaccine (HIGH DOSE) 0.7 milliLiter(s) IntraMuscular once  insulin lispro (ADMELOG) corrective regimen sliding scale   SubCutaneous three times a day before meals  phenazopyridine 100 milliGRAM(s) Oral every 8 hours  potassium chloride    Tablet ER 20 milliEquivalent(s) Oral every 2 hours  sodium chloride 0.9%. 1000 milliLiter(s) IV Continuous <Continuous>  tamsulosin 0.4 milliGRAM(s) Oral at bedtime    PRN MEDICATIONS  acetaminophen     Tablet .. 650 milliGRAM(s) Oral every 6 hours PRN  aluminum hydroxide/magnesium hydroxide/simethicone Suspension 30 milliLiter(s) Oral every 4 hours PRN  dextrose Oral Gel 15 Gram(s) Oral once PRN  ondansetron Injectable 4 milliGRAM(s) IV Push every 8 hours PRN  zolpidem 5 milliGRAM(s) Oral at bedtime PRN  zolpidem 5 milliGRAM(s) Oral at bedtime PRN    VITALS:   T(F): 99  HR: 81  BP: 134/79  RR: 16  SpO2: 97%    LABS:                          10.7   22.91 )-----------( 182      ( 13 Oct 2022 08:23 )             33.7     10-13    140  |  103  |  12  ----------------------------<  149<H>  3.4<L>   |  25  |  0.8    Ca    8.5      13 Oct 2022 08:23    TPro  6.0  /  Alb  3.3<L>  /  TBili  1.0  /  DBili  x   /  AST  13  /  ALT  10  /  AlkPhos  69  10-13    PT/INR - ( 13 Oct 2022 08:23 )   PT: 18.70 sec;   INR: 1.62 ratio         PTT - ( 12 Oct 2022 06:20 )  PTT:31.4 sec  Urinalysis Basic - ( 12 Oct 2022 05:40 )    Color: Brown / Appearance: Cloudy / S.025 / pH: x  Gluc: x / Ketone: 15  / Bili: Small / Urobili: 1.0 mg/dL   Blood: x / Protein: >=300 mg/dL / Nitrite: Positive   Leuk Esterase: Large / RBC: >50 /HPF / WBC >50 /HPF   Sq Epi: x / Non Sq Epi: Occasional /HPF / Bacteria: TNTC /HPF    RADIOLOGY:    PHYSICAL EXAM:  GEN: No acute distress  HEENT: normocephalic, atraumatic, aniceteric  LUNGS: Clear to auscultation bilaterally, no rales/wheezing/ rhonchi  HEART: S1/S2 present. RRR, no murmurs  ABD: Soft, non-tender, non-distended. Bowel sounds present  EXT: no edema   NEURO: no focal deficits       ASSESSMENT AND PLAN:    77 yo M PMH of BPH, DM, HTN, and HLD admitted for cystitis     #Urosepsis   #Urine retention secondary to obstruction in setting of UTI and BPH   #Hx Lithotripsy with stent placement , stent removed   -WBC 19.69  -UA positive  -Lanza placed for retention  , TOV when ambulating   -CT A/P: 1.  Partially decompressed bladder containing Lanza catheter however there is markedly thickened bladder wall with perivesicular inflammation highly suspicious for cystitis/urinary tract infection.  2.  Previously seen left hydroureteronephrosis and distal ureteral obstructing calculus have resolved.  -Urology input appreciated   -F/u Blood and urine clx  -Kidney fx normal   -Continue pyridium   -IVF, can stop if eating  -Rocephin     #Hx kidney stones   -Resolved on CT A/P. No hydronephrosis     #Hx of adrenal mass  -Outpatient f/u     #BPH  -Continue flomax    #DM  -POCT 213  -Sliding scale insulin    #HLD  -Continue statin     #HTN  -Continue amlodipine, enalapril     #DVT ppx: Lovenox   #GI ppx: None needed  #Diet: DASH  #Activity: As tolerated     D/w Dr. Hirsch

## 2022-10-14 ENCOUNTER — TRANSCRIPTION ENCOUNTER (OUTPATIENT)
Age: 77
End: 2022-10-14

## 2022-10-14 VITALS
TEMPERATURE: 99 F | RESPIRATION RATE: 17 BRPM | SYSTOLIC BLOOD PRESSURE: 155 MMHG | DIASTOLIC BLOOD PRESSURE: 72 MMHG | HEART RATE: 76 BPM

## 2022-10-14 LAB
-  AMIKACIN: SIGNIFICANT CHANGE UP
-  AMOXICILLIN/CLAVULANIC ACID: SIGNIFICANT CHANGE UP
-  AMPICILLIN/SULBACTAM: SIGNIFICANT CHANGE UP
-  AMPICILLIN: SIGNIFICANT CHANGE UP
-  AZTREONAM: SIGNIFICANT CHANGE UP
-  CEFAZOLIN: SIGNIFICANT CHANGE UP
-  CEFEPIME: SIGNIFICANT CHANGE UP
-  CEFOXITIN: SIGNIFICANT CHANGE UP
-  CEFTRIAXONE: SIGNIFICANT CHANGE UP
-  CIPROFLOXACIN: SIGNIFICANT CHANGE UP
-  ERTAPENEM: SIGNIFICANT CHANGE UP
-  GENTAMICIN: SIGNIFICANT CHANGE UP
-  IMIPENEM: SIGNIFICANT CHANGE UP
-  LEVOFLOXACIN: SIGNIFICANT CHANGE UP
-  MEROPENEM: SIGNIFICANT CHANGE UP
-  NITROFURANTOIN: SIGNIFICANT CHANGE UP
-  PIPERACILLIN/TAZOBACTAM: SIGNIFICANT CHANGE UP
-  TIGECYCLINE: SIGNIFICANT CHANGE UP
-  TOBRAMYCIN: SIGNIFICANT CHANGE UP
-  TRIMETHOPRIM/SULFAMETHOXAZOLE: SIGNIFICANT CHANGE UP
ALBUMIN SERPL ELPH-MCNC: 3.1 G/DL — LOW (ref 3.5–5.2)
ALP SERPL-CCNC: 61 U/L — SIGNIFICANT CHANGE UP (ref 30–115)
ALT FLD-CCNC: 13 U/L — SIGNIFICANT CHANGE UP (ref 0–41)
ANION GAP SERPL CALC-SCNC: 8 MMOL/L — SIGNIFICANT CHANGE UP (ref 7–14)
AST SERPL-CCNC: 16 U/L — SIGNIFICANT CHANGE UP (ref 0–41)
BASOPHILS # BLD AUTO: 0.06 K/UL — SIGNIFICANT CHANGE UP (ref 0–0.2)
BASOPHILS NFR BLD AUTO: 0.4 % — SIGNIFICANT CHANGE UP (ref 0–1)
BILIRUB SERPL-MCNC: 1.1 MG/DL — SIGNIFICANT CHANGE UP (ref 0.2–1.2)
BUN SERPL-MCNC: 13 MG/DL — SIGNIFICANT CHANGE UP (ref 10–20)
CALCIUM SERPL-MCNC: 8.3 MG/DL — LOW (ref 8.4–10.5)
CHLORIDE SERPL-SCNC: 103 MMOL/L — SIGNIFICANT CHANGE UP (ref 98–110)
CO2 SERPL-SCNC: 27 MMOL/L — SIGNIFICANT CHANGE UP (ref 17–32)
CREAT SERPL-MCNC: 0.6 MG/DL — LOW (ref 0.7–1.5)
CULTURE RESULTS: SIGNIFICANT CHANGE UP
EGFR: 100 ML/MIN/1.73M2 — SIGNIFICANT CHANGE UP
EOSINOPHIL # BLD AUTO: 0.15 K/UL — SIGNIFICANT CHANGE UP (ref 0–0.7)
EOSINOPHIL NFR BLD AUTO: 1.1 % — SIGNIFICANT CHANGE UP (ref 0–8)
GLUCOSE BLDC GLUCOMTR-MCNC: 161 MG/DL — HIGH (ref 70–99)
GLUCOSE BLDC GLUCOMTR-MCNC: 206 MG/DL — HIGH (ref 70–99)
GLUCOSE SERPL-MCNC: 231 MG/DL — HIGH (ref 70–99)
HCT VFR BLD CALC: 31.4 % — LOW (ref 42–52)
HGB BLD-MCNC: 10.2 G/DL — LOW (ref 14–18)
IMM GRANULOCYTES NFR BLD AUTO: 0.8 % — HIGH (ref 0.1–0.3)
LYMPHOCYTES # BLD AUTO: 1.28 K/UL — SIGNIFICANT CHANGE UP (ref 1.2–3.4)
LYMPHOCYTES # BLD AUTO: 9 % — LOW (ref 20.5–51.1)
MCHC RBC-ENTMCNC: 31.9 PG — HIGH (ref 27–31)
MCHC RBC-ENTMCNC: 32.5 G/DL — SIGNIFICANT CHANGE UP (ref 32–37)
MCV RBC AUTO: 98.1 FL — HIGH (ref 80–94)
METHOD TYPE: SIGNIFICANT CHANGE UP
MONOCYTES # BLD AUTO: 1.82 K/UL — HIGH (ref 0.1–0.6)
MONOCYTES NFR BLD AUTO: 12.8 % — HIGH (ref 1.7–9.3)
NEUTROPHILS # BLD AUTO: 10.74 K/UL — HIGH (ref 1.4–6.5)
NEUTROPHILS NFR BLD AUTO: 75.9 % — HIGH (ref 42.2–75.2)
NRBC # BLD: 0 /100 WBCS — SIGNIFICANT CHANGE UP (ref 0–0)
ORGANISM # SPEC MICROSCOPIC CNT: SIGNIFICANT CHANGE UP
ORGANISM # SPEC MICROSCOPIC CNT: SIGNIFICANT CHANGE UP
PLATELET # BLD AUTO: 181 K/UL — SIGNIFICANT CHANGE UP (ref 130–400)
POTASSIUM SERPL-MCNC: 3.4 MMOL/L — LOW (ref 3.5–5)
POTASSIUM SERPL-SCNC: 3.4 MMOL/L — LOW (ref 3.5–5)
PROT SERPL-MCNC: 6 G/DL — SIGNIFICANT CHANGE UP (ref 6–8)
RBC # BLD: 3.2 M/UL — LOW (ref 4.7–6.1)
RBC # FLD: 15 % — HIGH (ref 11.5–14.5)
SODIUM SERPL-SCNC: 138 MMOL/L — SIGNIFICANT CHANGE UP (ref 135–146)
SPECIMEN SOURCE: SIGNIFICANT CHANGE UP
WBC # BLD: 14.17 K/UL — HIGH (ref 4.8–10.8)
WBC # FLD AUTO: 14.17 K/UL — HIGH (ref 4.8–10.8)

## 2022-10-14 PROCEDURE — 99239 HOSP IP/OBS DSCHRG MGMT >30: CPT

## 2022-10-14 RX ORDER — ACETAMINOPHEN 500 MG
2 TABLET ORAL
Qty: 0 | Refills: 0 | DISCHARGE
Start: 2022-10-14

## 2022-10-14 RX ORDER — AZTREONAM 2 G
1 VIAL (EA) INJECTION
Qty: 10 | Refills: 0
Start: 2022-10-14 | End: 2022-10-18

## 2022-10-14 RX ADMIN — Medication 100 MILLIGRAM(S): at 05:30

## 2022-10-14 RX ADMIN — AMLODIPINE BESYLATE 5 MILLIGRAM(S): 2.5 TABLET ORAL at 05:30

## 2022-10-14 RX ADMIN — CEFEPIME 100 MILLIGRAM(S): 1 INJECTION, POWDER, FOR SOLUTION INTRAMUSCULAR; INTRAVENOUS at 05:30

## 2022-10-14 RX ADMIN — Medication 20 MILLIGRAM(S): at 05:30

## 2022-10-14 RX ADMIN — Medication 2: at 12:22

## 2022-10-14 RX ADMIN — Medication 1: at 08:04

## 2022-10-14 RX ADMIN — SODIUM CHLORIDE 100 MILLILITER(S): 9 INJECTION INTRAMUSCULAR; INTRAVENOUS; SUBCUTANEOUS at 05:29

## 2022-10-14 NOTE — DISCHARGE NOTE PROVIDER - NSDCMRMEDTOKEN_GEN_ALL_CORE_FT
acetaminophen 325 mg oral tablet: 2 tab(s) orally every 6 hours, As needed, Temp greater or equal to 38C (100.4F), Mild Pain (1 - 3)  Ambien: 10  orally once a day (at bedtime)  amLODIPine: 5  orally once a day  Bactrim  mg-160 mg oral tablet: 1 tab(s) orally 2 times a day   enalapril: 20  orally 2 times a day  Flomax 0.4 mg oral capsule: 1 cap(s) orally once a day   Lipitor: 20  orally once a day (at bedtime)  metFORMIN: 500  orally 2 times a day  Pyridium 100 mg oral tablet: 1 tab(s) orally 3 times a day (after meals)

## 2022-10-14 NOTE — DISCHARGE NOTE PROVIDER - CARE PROVIDER_API CALL
Cadence Maciel)  Urology  [address] [place] [address], [address]  [place] [address]  Phone: [phone]  Fax: [phone]  Follow Up Time:     primary care, primary care  Phone: (   )    -  Fax: (   )    -  Follow Up Time:

## 2022-10-14 NOTE — DISCHARGE NOTE PROVIDER - ATTENDING DISCHARGE PHYSICAL EXAMINATION:
PHYSICAL EXAM:  GENERAL: NAD, lying in bed comfortably  HEAD:  Atraumatic, Normocephalic  EYES: EOMI, PERRLA, conjunctiva and sclera clear  ENT: Moist mucous membranes  NECK: Supple, No JVD  CHEST/LUNG: Clear to auscultation bilaterally; No rales, rhonchi, wheezing, or rubs. Unlabored respirations  HEART: Regular rate and rhythm; No murmurs, rubs, or gallops  ABDOMEN: Bowel sounds present; Soft, Nontender, Nondistended. No hepatomegally  EXTREMITIES:  2+ Peripheral Pulses, brisk capillary refill. No clubbing, cyanosis, or edema  NERVOUS SYSTEM:  No deficits

## 2022-10-14 NOTE — DISCHARGE NOTE PROVIDER - NSDCCPCAREPLAN_GEN_ALL_CORE_FT
PRINCIPAL DISCHARGE DIAGNOSIS  Diagnosis: Acute UTI  Assessment and Plan of Treatment: you presented with urinary tract infection and had sepsis, no stones were seen on CT scan and blood cultures were negative, complete course of antibiotic bactrim 1 tab orally twice a day for 5 more days. You also retained urine as a result of the urinary tract infection and did not pass trial of void. your tapia was reinserted. Dr. Steinberg urology is aware and wants you to full up in his office on Monday 10/17/2022. Call the office upon discharge to schedule an appointment. f/u with primary care in 1 week

## 2022-10-14 NOTE — DISCHARGE NOTE PROVIDER - NSDCFUSCHEDAPPT_GEN_ALL_CORE_FT
Ryan Maciel  [facility] Physician Pending sale to [facility]  UROLOGY [address] [place] [address]  Scheduled Appointment: 11/14/2022

## 2022-10-14 NOTE — DISCHARGE NOTE PROVIDER - CARE PROVIDERS DIRECT ADDRESSES
[email or web address]

## 2022-10-14 NOTE — DISCHARGE NOTE NURSING/CASE MANAGEMENT/SOCIAL WORK - PATIENT PORTAL LINK FT
You can access the FollowMyHealth Patient Portal offered by [facility] by registering at the following website: [email or web address]. By joining [employer]’s FollowMyHealth portal, you will also be able to view your health information using other applications (apps) compatible with our system.

## 2022-10-14 NOTE — DISCHARGE NOTE NURSING/CASE MANAGEMENT/SOCIAL WORK - NSDCPEFALRISK_GEN_ALL_CORE
For information on Fall & Injury Prevention, visit: [email or web address] OR  [email or web address] OR  https://www.cdc.gov/steadi/patient.html

## 2022-10-14 NOTE — DISCHARGE NOTE PROVIDER - PROVIDER TOKENS
PROVIDER:[TOKEN:[[id number]]],FREE:[LAST:[primary care],FIRST:[primary care],PHONE:[(   )    -],FAX:[(   )    -]]

## 2022-10-14 NOTE — DISCHARGE NOTE PROVIDER - HOSPITAL COURSE
75 yo M PMH of BPH, DM, HTN, and HLD presents to ED complaining of dysuria, urinary retention, hematuria, and fever. Pt s/p lithotripsy w/ stent placement on 9/14 (stent removed). Pt notes that urinary symptoms (dysuria, increased frequency, R flank/groin pain, hematuria) persisted from surgery over the past few days. Last night he noticed increasing urinary symptoms, as well as fever, chills, and weakness, which prompted ER visit. Denies nausea, chest pain, SOB, leg pain/edema    In the ER pt was given Rocephin for Cystitis     Patient admitted with sepsis UTI , found to have citrobacter kaseri in urine   his vital signs improved and wbc improved  he wanted to go home  he was retaining urine and attempted trial of void, which he passed originally but then started retaining again, tapia was reinserted  the patient was informed that he will need to follow up with urology Dr. Steinberg on Monday for trial of void, urology aware and agreeable with plan, spoke to MARIE Villagran on 10/14/2022  seen and examined on day of discharge, wanted to go home , stable

## 2022-10-17 ENCOUNTER — LABORATORY RESULT (OUTPATIENT)
Age: 77
End: 2022-10-17

## 2022-10-17 ENCOUNTER — APPOINTMENT (OUTPATIENT)
Dept: UROLOGY | Facility: CLINIC | Age: 77
End: 2022-10-17

## 2022-10-17 VITALS
SYSTOLIC BLOOD PRESSURE: 170 MMHG | TEMPERATURE: 97 F | OXYGEN SATURATION: 98 % | WEIGHT: 190 LBS | HEART RATE: 75 BPM | BODY MASS INDEX: 28.79 KG/M2 | RESPIRATION RATE: 16 BRPM | DIASTOLIC BLOOD PRESSURE: 83 MMHG | HEIGHT: 68 IN

## 2022-10-17 PROBLEM — E11.9 TYPE 2 DIABETES MELLITUS WITHOUT COMPLICATIONS: Chronic | Status: ACTIVE | Noted: 2022-10-12

## 2022-10-17 PROBLEM — N40.0 BENIGN PROSTATIC HYPERPLASIA WITHOUT LOWER URINARY TRACT SYMPTOMS: Chronic | Status: ACTIVE | Noted: 2022-10-12

## 2022-10-17 PROCEDURE — 99214 OFFICE O/P EST MOD 30 MIN: CPT

## 2022-10-18 ENCOUNTER — APPOINTMENT (OUTPATIENT)
Dept: UROLOGY | Facility: CLINIC | Age: 77
End: 2022-10-18

## 2022-10-18 VITALS
TEMPERATURE: 98.1 F | HEIGHT: 68 IN | WEIGHT: 190 LBS | BODY MASS INDEX: 28.79 KG/M2 | HEART RATE: 82 BPM | DIASTOLIC BLOOD PRESSURE: 77 MMHG | SYSTOLIC BLOOD PRESSURE: 159 MMHG

## 2022-10-18 PROCEDURE — 99214 OFFICE O/P EST MOD 30 MIN: CPT

## 2022-10-18 NOTE — ASSESSMENT
[FreeTextEntry1] : 76-year-old male with history of BPH and kidney stones.\par Patient is followed by Dr. Maciel and was last seen in office yesterday.\par \par He has chief complaint of difficulties urinating.  His bladder scan PVR today is 200 mL and urine sample left reveals clear yellow urine.  He is currently on Bactrim and last dose will be tomorrow.\par \par Patient is also currently on tamsulosin 0.4 mg daily.  He reports no side effects on this medication.\par \par Plan\par -Increase dose of tamsulosin to twice daily.  He is to take medication 30 minutes after breakfast and 30 minutes after dinner patient verbalized understanding of this instruction.\par -Urinalysis and urine culture ordered\par -Patient to follow-up 1 week with Dr. Maciel to reassess. [Urinary Symptom or Sign (788.99\R39.89)] : implantation

## 2022-10-18 NOTE — HISTORY OF PRESENT ILLNESS
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 Maciel for history of BPH and history of kidney stones.\par He is status post uteroscopy laser lithotripsy and stent placement and subsequent stent removal.  Patient was recently seen in hospital for urinary tract infection and had a Lanza catheter placed in the hospital which documented drained 150 cc of hematuria.  Patient was treated with antibiotics and is currently on Bactrim.  He had Lanza catheter removed in office yesterday and follow-up in the afternoon showed a PVR of 109 mL.\par \par Patient presents to office today for emergent follow-up stating that he has difficulties urinating.  He is currently on tamsulosin 0.4 mg once a day without any side effects.  Bladder scan PVR today is 200 mL.\par

## 2022-10-18 NOTE — PHYSICAL EXAM
[Well Groomed] : well groomed [General Appearance - In No Acute Distress] : no acute distress [Abdomen Soft] : soft [Abdomen Tenderness] : non-tender [] : no respiratory distress [Oriented To Time, Place, And Person] : oriented to person, place, and time [Normal Station and Gait] : the gait and station were normal for the patient's age

## 2022-10-19 DIAGNOSIS — F17.200 NICOTINE DEPENDENCE, UNSPECIFIED, UNCOMPLICATED: ICD-10-CM

## 2022-10-19 DIAGNOSIS — A41.9 SEPSIS, UNSPECIFIED ORGANISM: ICD-10-CM

## 2022-10-19 DIAGNOSIS — R33.8 OTHER RETENTION OF URINE: ICD-10-CM

## 2022-10-19 DIAGNOSIS — B96.89 OTHER SPECIFIED BACTERIAL AGENTS AS THE CAUSE OF DISEASES CLASSIFIED ELSEWHERE: ICD-10-CM

## 2022-10-19 DIAGNOSIS — N40.1 BENIGN PROSTATIC HYPERPLASIA WITH LOWER URINARY TRACT SYMPTOMS: ICD-10-CM

## 2022-10-19 DIAGNOSIS — I10 ESSENTIAL (PRIMARY) HYPERTENSION: ICD-10-CM

## 2022-10-19 DIAGNOSIS — Z79.84 LONG TERM (CURRENT) USE OF ORAL HYPOGLYCEMIC DRUGS: ICD-10-CM

## 2022-10-19 DIAGNOSIS — Z87.442 PERSONAL HISTORY OF URINARY CALCULI: ICD-10-CM

## 2022-10-19 DIAGNOSIS — Z98.1 ARTHRODESIS STATUS: ICD-10-CM

## 2022-10-19 DIAGNOSIS — N30.90 CYSTITIS, UNSPECIFIED WITHOUT HEMATURIA: ICD-10-CM

## 2022-10-19 DIAGNOSIS — E11.69 TYPE 2 DIABETES MELLITUS WITH OTHER SPECIFIED COMPLICATION: ICD-10-CM

## 2022-10-20 LAB
APPEARANCE: ABNORMAL
BACTERIA UR CULT: NORMAL
BILIRUBIN URINE: NEGATIVE
BLOOD URINE: ABNORMAL
COLOR: YELLOW
GLUCOSE QUALITATIVE U: NEGATIVE
KETONES URINE: NEGATIVE
LEUKOCYTE ESTERASE URINE: ABNORMAL
NITRITE URINE: NEGATIVE
PH URINE: 6.5
PROTEIN URINE: ABNORMAL
SPECIFIC GRAVITY URINE: 1.02
UROBILINOGEN URINE: NORMAL

## 2022-10-20 NOTE — ASSESSMENT
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

## 2022-10-20 NOTE — HISTORY OF PRESENT ILLNESS
[Urinary Retention] : urinary retention [FreeTextEntry1] : 75 yo with adrenal myelolipoma - right sided - 7 cm \par history of BPH\par history of nephrolithiasis - recent ureteroscopy laser lithotripsy and stent placement - encountered distal ureteral stricture 8/17/2022\par developed urinary retention and was hospitalized for elevation in WBC - complex UTI suspected\par he is here to have his catheter removed - an attempt while in the hospital - he failed his voiding trial\par \par  ml (states he voided several times during the day)\par \par 8//11/2022 CT scan\par IMPRESSION:\par \par 1. There is moderate right sided hydronephrosis with perinephric stranding and fluid. There is moderate dilatation of the left ureter to the level of a 6.8 x 6.4 x7.7 mm calculus (1289 HU) approximately 3 cm proximal to the left ureterovesical junction.\par \par 2. The oral contrast has passed through the small bowel and has just reached the cecum, without evidence of obstruction.\par \par 3. A moderate amount of fluid and gas is seen throughout the colon from the level of the cecum through the distal descending colon. No evidence of obstruction.\par \par 4. Stable large soft tissue mass arising in the right adrenal gland with foci of fat and calcification.\par \par HgbA1c - 7.8 (1/2022)\par PVR today 39 ml\par \par PSA 5/3/22\par 3.21 ng/ml\par 45% free\par \par Renal and Bladder US 6/2022\par \par Right kidney: 12.6 cm. 1.2 cm lower pole angiomyolipoma. 8 mm simple cyst. No hydronephrosis.\par \par Left kidney: 12.7 cm. 1.4 cm upper pole cyst. 1.9 cm x 1.6 cm mid pole cyst with a thin septae. No hydronephrosis.\par \par Urinary bladder: No debris or calculus. Bilateral ureteral jets are visualized. Prevoid volume of approximately 245 ml. 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 \par \par Right adrenal 5.2 x 5.5 x 5.9 cm calcium containing myelolipoma. \par \par Right renal mid pole 1.1 cm angiomyolipoma. Left renal cyst with mural \par calcifications, as above. \par \par Enlarged prostate measuring 46 cc in volume. \par \par Urinary bladder postvoid residual 115 cc. 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

## 2022-10-24 ENCOUNTER — APPOINTMENT (OUTPATIENT)
Dept: UROLOGY | Facility: CLINIC | Age: 77
End: 2022-10-24

## 2022-10-24 DIAGNOSIS — N20.0 CALCULUS OF KIDNEY: ICD-10-CM

## 2022-10-24 PROCEDURE — 99214 OFFICE O/P EST MOD 30 MIN: CPT

## 2022-10-25 PROBLEM — N20.0 NEPHROLITHIASIS: Status: ACTIVE | Noted: 2017-09-13

## 2022-10-25 LAB — NIDUS STONE QN: NORMAL

## 2022-10-25 NOTE — HISTORY OF PRESENT ILLNESS
[FreeTextEntry1] : 77 yo with adrenal myelolipoma - right sided - 7 cm \par history of BPH\par history of nephrolithiasis - recent ureteroscopy laser lithotripsy and stent placement - encountered distal ureteral stricture 8/17/2022\par developed urinary retention and was hospitalized for elevation in WBC - complex UTI suspected\par he is here to have his catheter removed - an attempt while in the hospital - he failed his voiding trial\par \par  ml on BID flomax\par \par his dysuria is improving\par still with diminished force of stream\par \par last culture\par <10,000 CFU \par \par last CT scan 10/12/2022\par IMPRESSION:\par 1. Partially decompressed bladder containing Lanza catheter however there is markedly thickened bladder wall with perivesicular inflammation highly suspicious for cystitis/urinary tract infection.\par 2. Previously seen left hydroureteronephrosis and distal ureteral obstructing calculus have resolved.\par 3. Grossly unchanged heterogeneous right adrenal mass with mixed attenuation including fat and calcification.\par \par  [Urinary Retention] : no urinary retention [Urinary Urgency] : no urinary urgency [Urinary Frequency] : no urinary frequency [Nocturia] : no nocturia [Straining] : no straining [Weak Stream] : no weak stream [Dysuria] : no dysuria [Hematuria - Gross] : no gross hematuria

## 2022-10-25 NOTE — ASSESSMENT
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 Erickson for consideration Aquablation\par - f/u as previously scheduled\par \par

## 2022-11-14 ENCOUNTER — APPOINTMENT (OUTPATIENT)
Dept: UROLOGY | Facility: CLINIC | Age: 77
End: 2022-11-14

## 2022-11-14 VITALS
BODY MASS INDEX: 28.04 KG/M2 | DIASTOLIC BLOOD PRESSURE: 80 MMHG | WEIGHT: 185 LBS | HEIGHT: 68 IN | SYSTOLIC BLOOD PRESSURE: 140 MMHG | HEART RATE: 80 BPM

## 2022-11-14 PROCEDURE — 99214 OFFICE O/P EST MOD 30 MIN: CPT

## 2022-11-17 NOTE — ASSESSMENT
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 Erickson for consideration Aquablation\par - f/u in 6 months to re-assess\par \par

## 2022-11-17 NOTE — HISTORY OF PRESENT ILLNESS
[FreeTextEntry1] : 75 yo with adrenal myelolipoma - right sided - 7 cm \par history of BPH with retention - now on BID flomax and proscar\par history of nephrolithiasis - recent ureteroscopy laser lithotripsy and stent placement - encountered distal ureteral stricture 8/17/2022\par developed urinary retention and was hospitalized for elevation in WBC - complex UTI suspected\par he is here to have his catheter removed - an attempt while in the hospital - he failed his voiding trial\par \par PVR # 1 155 ml \par PVR # 2   56 ml \par \par remains on BID flomax and finasteride\par \par his dysuria is improving\par still with diminished force of stream\par \par last culture\par <10,000 CFU \par \par last CT scan 10/12/2022\par IMPRESSION:\par 1. Partially decompressed bladder containing Lanza catheter however there is markedly thickened bladder wall with perivesicular inflammation highly suspicious for cystitis/urinary tract infection.\par 2. Previously seen left hydroureteronephrosis and distal ureteral obstructing calculus have resolved.\par 3. Grossly unchanged heterogeneous right adrenal mass with mixed attenuation including fat and calcification.\par \par

## 2022-11-22 ENCOUNTER — APPOINTMENT (OUTPATIENT)
Dept: UROLOGY | Facility: CLINIC | Age: 77
End: 2022-11-22

## 2022-11-22 VITALS
WEIGHT: 185 LBS | HEIGHT: 68 IN | DIASTOLIC BLOOD PRESSURE: 82 MMHG | TEMPERATURE: 97.3 F | SYSTOLIC BLOOD PRESSURE: 141 MMHG | BODY MASS INDEX: 28.04 KG/M2 | RESPIRATION RATE: 16 BRPM | OXYGEN SATURATION: 98 % | HEART RATE: 78 BPM

## 2022-11-22 DIAGNOSIS — N20.1 CALCULUS OF URETER: ICD-10-CM

## 2022-11-22 DIAGNOSIS — R30.0 DYSURIA: ICD-10-CM

## 2022-11-22 PROCEDURE — 81003 URINALYSIS AUTO W/O SCOPE: CPT | Mod: QW

## 2022-11-22 PROCEDURE — 99213 OFFICE O/P EST LOW 20 MIN: CPT

## 2022-11-22 NOTE — HISTORY OF PRESENT ILLNESS
[FreeTextEntry1] : 78 y/o male who was referred by Dr. Maciel for possible aqua ablation. Pt has actually been voiding better since his stone procedure. Currently, he has nocturia x3 and feels that his stream is slow, but this is acceptable for him. Pt has a hx of multiple back issues and cervical neck fusion. \par \par PSA 5/2022: 3.21\par \par medications: tamsulosin and finasteride

## 2022-11-22 NOTE — ASSESSMENT
[FreeTextEntry1] : 76 y/o male with a hx of BPH secondary to BPH and possible related to some of his back issues. We discussed this in detail. Since he is overall doing better, we will set him up for a VUDS to define the degree of obstruction. I've given him information about aqua ablation and he will read through that. Based on the results of the VUDS and his symptoms complex, we will make a final decision about how to proceed. All his questions were otherwise answered. Total time = 20 mins. \par \par The submitted E/M billing level for this visit reflects the total time spent on the day of the visit including face-to-face time spent with the patient, non-face-to-face review of medical records and relevant information, documentation, and asynchronous communication with the patient after a visit via phone, email, or patient’s EHR portal after the visit. \par The medical records reviewed are either scanned into the chart or reviewed with the patient using a patient’s electronic medical records portal for patients with records not available to [facility] via electronic transmission platforms from other institutions and labs. \par Time spend counseling and performing coordination of care was also included in determining the appropriate EM billing level.\par \par I have reviewed and verified information regarding the chief complaint and history recorded by the ancillary staff and/or the patient. I have independently reviewed and interpreted tests performed by other physicians and facilities as necessary. \par \par I have discussed with the patient differential diagnosis, reason for auxiliary tests if ordered, risks, benefits, alternatives, and complications of each form of therapy were discussed.\par

## 2022-11-28 LAB
BACTERIA UR CULT: ABNORMAL
BILIRUB UR QL STRIP: NORMAL
COLLECTION METHOD: NORMAL
GLUCOSE UR-MCNC: 100
HCG UR QL: 0.2 EU/DL
HGB UR QL STRIP.AUTO: NORMAL
KETONES UR-MCNC: NORMAL
LEUKOCYTE ESTERASE UR QL STRIP: NORMAL
NITRITE UR QL STRIP: POSITIVE
PH UR STRIP: 6
PROT UR STRIP-MCNC: NORMAL
SP GR UR STRIP: 1.02

## 2022-11-28 RX ORDER — NITROFURANTOIN (MONOHYDRATE/MACROCRYSTALS) 25; 75 MG/1; MG/1
100 CAPSULE ORAL TWICE DAILY
Qty: 14 | Refills: 0 | Status: ACTIVE | COMMUNITY
Start: 2022-11-28 | End: 1900-01-01

## 2022-11-29 LAB — NIDUS STONE QN: NORMAL

## 2023-01-18 ENCOUNTER — OUTPATIENT (OUTPATIENT)
Dept: OUTPATIENT SERVICES | Facility: HOSPITAL | Age: 78
LOS: 1 days | Discharge: HOME | End: 2023-01-18

## 2023-01-18 ENCOUNTER — APPOINTMENT (OUTPATIENT)
Dept: UROLOGY | Facility: HOSPITAL | Age: 78
End: 2023-01-18
Payer: MEDICARE

## 2023-01-18 DIAGNOSIS — R33.9 RETENTION OF URINE, UNSPECIFIED: ICD-10-CM

## 2023-01-18 DIAGNOSIS — Z98.890 OTHER SPECIFIED POSTPROCEDURAL STATES: Chronic | ICD-10-CM

## 2023-01-18 DIAGNOSIS — Z98.1 ARTHRODESIS STATUS: Chronic | ICD-10-CM

## 2023-01-18 PROCEDURE — 51600 INJECTION FOR BLADDER X-RAY: CPT

## 2023-01-18 PROCEDURE — 51728 CYSTOMETROGRAM W/VP: CPT | Mod: 26

## 2023-01-18 PROCEDURE — 51784 ANAL/URINARY MUSCLE STUDY: CPT | Mod: 26

## 2023-01-18 PROCEDURE — 51797 INTRAABDOMINAL PRESSURE TEST: CPT | Mod: 26

## 2023-01-18 PROCEDURE — 74455 X-RAY URETHRA/BLADDER: CPT | Mod: 26

## 2023-01-19 PROBLEM — R33.9 URINARY RETENTION: Status: ACTIVE | Noted: 2022-10-20

## 2023-01-23 ENCOUNTER — APPOINTMENT (OUTPATIENT)
Dept: UROLOGY | Facility: CLINIC | Age: 78
End: 2023-01-23

## 2023-01-27 ENCOUNTER — NON-APPOINTMENT (OUTPATIENT)
Age: 78
End: 2023-01-27

## 2023-01-29 NOTE — ASU DISCHARGE PLAN (ADULT/PEDIATRIC) - NS MD DC FALL RISK RISK
For information on Fall & Injury Prevention, visit: [email or web address] OR  [email or web address] OR  https://www.cdc.gov/steadi/patient.html Calm/Appropriate/Playful

## 2023-01-30 ENCOUNTER — RESULT REVIEW (OUTPATIENT)
Age: 78
End: 2023-01-30

## 2023-01-30 ENCOUNTER — OUTPATIENT (OUTPATIENT)
Dept: OUTPATIENT SERVICES | Facility: HOSPITAL | Age: 78
LOS: 1 days | Discharge: HOME | End: 2023-01-30
Payer: MEDICARE

## 2023-01-30 DIAGNOSIS — Z98.890 OTHER SPECIFIED POSTPROCEDURAL STATES: Chronic | ICD-10-CM

## 2023-01-30 DIAGNOSIS — Z98.1 ARTHRODESIS STATUS: Chronic | ICD-10-CM

## 2023-01-30 DIAGNOSIS — R07.9 CHEST PAIN, UNSPECIFIED: ICD-10-CM

## 2023-01-30 PROCEDURE — 71271 CT THORAX LUNG CANCER SCR C-: CPT | Mod: 26

## 2023-02-02 ENCOUNTER — RX RENEWAL (OUTPATIENT)
Age: 78
End: 2023-02-02

## 2023-02-20 ENCOUNTER — OUTPATIENT (OUTPATIENT)
Dept: OUTPATIENT SERVICES | Facility: HOSPITAL | Age: 78
LOS: 1 days | End: 2023-02-20
Payer: MEDICARE

## 2023-02-20 DIAGNOSIS — Z98.890 OTHER SPECIFIED POSTPROCEDURAL STATES: Chronic | ICD-10-CM

## 2023-02-20 DIAGNOSIS — Z00.8 ENCOUNTER FOR OTHER GENERAL EXAMINATION: ICD-10-CM

## 2023-02-20 DIAGNOSIS — Z98.1 ARTHRODESIS STATUS: Chronic | ICD-10-CM

## 2023-02-20 DIAGNOSIS — R93.5 ABNORMAL FINDINGS ON DIAGNOSTIC IMAGING OF OTHER ABDOMINAL REGIONS, INCLUDING RETROPERITONEUM: ICD-10-CM

## 2023-02-20 LAB
GLUCOSE BLDC GLUCOMTR-MCNC: 229 MG/DL — HIGH (ref 70–99)
GLUCOSE BLDC GLUCOMTR-MCNC: 244 MG/DL — HIGH (ref 70–99)

## 2023-02-20 PROCEDURE — 82962 GLUCOSE BLOOD TEST: CPT

## 2023-02-21 DIAGNOSIS — R93.5 ABNORMAL FINDINGS ON DIAGNOSTIC IMAGING OF OTHER ABDOMINAL REGIONS, INCLUDING RETROPERITONEUM: ICD-10-CM

## 2023-02-28 ENCOUNTER — RESULT REVIEW (OUTPATIENT)
Age: 78
End: 2023-02-28

## 2023-02-28 LAB — GLUCOSE BLDC GLUCOMTR-MCNC: 139 MG/DL — HIGH (ref 70–99)

## 2023-03-01 ENCOUNTER — RESULT REVIEW (OUTPATIENT)
Age: 78
End: 2023-03-01

## 2023-03-01 ENCOUNTER — OUTPATIENT (OUTPATIENT)
Dept: OUTPATIENT SERVICES | Facility: HOSPITAL | Age: 78
LOS: 1 days | End: 2023-03-01
Payer: MEDICARE

## 2023-03-01 DIAGNOSIS — Z98.890 OTHER SPECIFIED POSTPROCEDURAL STATES: Chronic | ICD-10-CM

## 2023-03-01 DIAGNOSIS — Z98.1 ARTHRODESIS STATUS: Chronic | ICD-10-CM

## 2023-03-01 PROCEDURE — 78815 PET IMAGE W/CT SKULL-THIGH: CPT | Mod: 26,PI,76

## 2023-03-01 PROCEDURE — 78815 PET IMAGE W/CT SKULL-THIGH: CPT | Mod: PI

## 2023-03-01 PROCEDURE — 78815 PET IMAGE W/CT SKULL-THIGH: CPT | Mod: 26,PI

## 2023-03-10 DIAGNOSIS — R93.5 ABNORMAL FINDINGS ON DIAGNOSTIC IMAGING OF OTHER ABDOMINAL REGIONS, INCLUDING RETROPERITONEUM: ICD-10-CM

## 2023-03-11 DIAGNOSIS — R93.5 ABNORMAL FINDINGS ON DIAGNOSTIC IMAGING OF OTHER ABDOMINAL REGIONS, INCLUDING RETROPERITONEUM: ICD-10-CM

## 2023-04-10 NOTE — ED ADULT TRIAGE NOTE - TEMPERATURE IN CELSIUS (DEGREES C)
Lisinopril-hydrochlorothiazide (Zestoretic) 20-25 mg    Last office visit 11/9/22, med-check.    Upcoming visit 5/10/23, cpe.    Last refill 11/10/21.    Refills provided.  Additional refills to be addressed at upcoming appt.  
36

## 2023-04-24 ENCOUNTER — APPOINTMENT (OUTPATIENT)
Dept: UROLOGY | Facility: CLINIC | Age: 78
End: 2023-04-24
Payer: MEDICARE

## 2023-04-24 VITALS
BODY MASS INDEX: 27.28 KG/M2 | SYSTOLIC BLOOD PRESSURE: 178 MMHG | HEIGHT: 68 IN | WEIGHT: 180 LBS | HEART RATE: 65 BPM | DIASTOLIC BLOOD PRESSURE: 77 MMHG

## 2023-04-24 PROCEDURE — 99214 OFFICE O/P EST MOD 30 MIN: CPT

## 2023-04-24 RX ORDER — TAMSULOSIN HYDROCHLORIDE 0.4 MG/1
0.4 CAPSULE ORAL
Qty: 90 | Refills: 0 | Status: ACTIVE | COMMUNITY
Start: 2019-07-11 | End: 1900-01-01

## 2023-04-24 RX ORDER — FINASTERIDE 5 MG/1
5 TABLET, FILM COATED ORAL DAILY
Qty: 90 | Refills: 3 | Status: ACTIVE | COMMUNITY
Start: 2022-10-25 | End: 1900-01-01

## 2023-04-24 NOTE — ASSESSMENT
[FreeTextEntry1] : 76 yo with bladder outlet obstruction\par adrenal myelolipoma - stable on PET scan 3/2/2023\par \par - notified  regarding billing issues\par - notified Dr. Erickson regarding posterior tibial nerve stimulation\par - f/u in 6 months with US\par - PSA in 6 months

## 2023-04-24 NOTE — HISTORY OF PRESENT ILLNESS
[FreeTextEntry1] : 76 yo with adrenal myelolipoma - right sided - 7 cm \par history of BPH with retention - now on BID flomax and proscar\par history of nephrolithiasis - recent ureteroscopy laser lithotripsy and stent placement - encountered distal ureteral stricture 8/17/2022\par \par remains on BID flomax and finasteride\par \par has seen Dr. Erickson scheduled for posterior tibial nerve stimulation\par  \par the patient is here to discuss scheduling posterior tibial nerve stimulation\par \par he is having repeat imaging for lung lesions\par \par PET scan 3/1/2023 reviewed\par  [Urinary Retention] : no urinary retention [Urinary Urgency] : no urinary urgency [Urinary Frequency] : no urinary frequency [Nocturia] : no nocturia [Straining] : no straining [Weak Stream] : no weak stream [Dysuria] : no dysuria [Hematuria - Gross] : no gross hematuria

## 2023-05-04 NOTE — LETTER BODY
[Dear  ___] : Dear  [unfilled], [Consult Letter:] : I had the pleasure of evaluating your patient, [unfilled]. [Please see my note below.] : Please see my note below. [Consult Closing:] : Thank you very much for allowing me to participate in the care of this patient.  If you have any questions, please do not hesitate to contact me. [Sincerely,] : Sincerely, [FreeTextEntry3] : Cadence Maciel MD, FACS\par  ---

## 2023-10-06 ENCOUNTER — APPOINTMENT (OUTPATIENT)
Dept: UROLOGY | Facility: CLINIC | Age: 78
End: 2023-10-06
Payer: MEDICARE

## 2023-10-06 DIAGNOSIS — R39.9 UNSPECIFIED SYMPTOMS AND SIGNS INVOLVING THE GENITOURINARY SYSTEM: ICD-10-CM

## 2023-10-06 LAB
BILIRUB UR QL STRIP: NORMAL
GLUCOSE UR-MCNC: 500
HCG UR QL: 0.2 EU/DL
HGB UR QL STRIP.AUTO: NORMAL
KETONES UR-MCNC: NORMAL
LEUKOCYTE ESTERASE UR QL STRIP: NORMAL
NITRITE UR QL STRIP: POSITIVE
PH UR STRIP: 5
PROT UR STRIP-MCNC: 100
SP GR UR STRIP: 1.03

## 2023-10-06 PROCEDURE — 64566 NEUROELTRD STIM POST TIBIAL: CPT

## 2023-10-06 PROCEDURE — 81003 URINALYSIS AUTO W/O SCOPE: CPT | Mod: QW

## 2023-10-10 ENCOUNTER — OUTPATIENT (OUTPATIENT)
Dept: OUTPATIENT SERVICES | Facility: HOSPITAL | Age: 78
LOS: 1 days | End: 2023-10-10
Payer: MEDICARE

## 2023-10-10 ENCOUNTER — RESULT REVIEW (OUTPATIENT)
Age: 78
End: 2023-10-10

## 2023-10-10 DIAGNOSIS — Z98.890 OTHER SPECIFIED POSTPROCEDURAL STATES: Chronic | ICD-10-CM

## 2023-10-10 DIAGNOSIS — D30.00 BENIGN NEOPLASM OF UNSPECIFIED KIDNEY: ICD-10-CM

## 2023-10-10 DIAGNOSIS — N40.1 BENIGN PROSTATIC HYPERPLASIA WITH LOWER URINARY TRACT SYMPTOMS: ICD-10-CM

## 2023-10-10 DIAGNOSIS — Z98.1 ARTHRODESIS STATUS: Chronic | ICD-10-CM

## 2023-10-10 PROCEDURE — 76770 US EXAM ABDO BACK WALL COMP: CPT

## 2023-10-10 PROCEDURE — 76770 US EXAM ABDO BACK WALL COMP: CPT | Mod: 26

## 2023-10-11 DIAGNOSIS — D30.00 BENIGN NEOPLASM OF UNSPECIFIED KIDNEY: ICD-10-CM

## 2023-10-11 DIAGNOSIS — N40.1 BENIGN PROSTATIC HYPERPLASIA WITH LOWER URINARY TRACT SYMPTOMS: ICD-10-CM

## 2023-10-13 ENCOUNTER — APPOINTMENT (OUTPATIENT)
Dept: UROLOGY | Facility: CLINIC | Age: 78
End: 2023-10-13

## 2023-10-13 RX ORDER — CEPHALEXIN 500 MG/1
500 CAPSULE ORAL EVERY 8 HOURS
Qty: 21 | Refills: 0 | Status: ACTIVE | COMMUNITY
Start: 2023-10-13 | End: 1900-01-01

## 2023-10-14 ENCOUNTER — LABORATORY RESULT (OUTPATIENT)
Age: 78
End: 2023-10-14

## 2023-10-20 ENCOUNTER — APPOINTMENT (OUTPATIENT)
Dept: UROLOGY | Facility: CLINIC | Age: 78
End: 2023-10-20
Payer: MEDICARE

## 2023-10-20 PROCEDURE — 64566 NEUROELTRD STIM POST TIBIAL: CPT

## 2023-10-24 RX ORDER — TAMSULOSIN HYDROCHLORIDE 0.4 MG/1
0.4 CAPSULE ORAL
Qty: 90 | Refills: 3 | Status: ACTIVE | COMMUNITY
Start: 2023-04-24 | End: 1900-01-01

## 2023-10-26 ENCOUNTER — APPOINTMENT (OUTPATIENT)
Dept: UROLOGY | Facility: CLINIC | Age: 78
End: 2023-10-26
Payer: MEDICARE

## 2023-10-26 VITALS
BODY MASS INDEX: 27.28 KG/M2 | TEMPERATURE: 97.1 F | SYSTOLIC BLOOD PRESSURE: 164 MMHG | RESPIRATION RATE: 16 BRPM | DIASTOLIC BLOOD PRESSURE: 72 MMHG | HEART RATE: 66 BPM | WEIGHT: 180 LBS | OXYGEN SATURATION: 98 % | HEIGHT: 68 IN

## 2023-10-26 DIAGNOSIS — D49.7 NEOPLASM OF UNSPECIFIED BEHAVIOR OF ENDOCRINE GLANDS AND OTHER PARTS OF NERVOUS SYSTEM: ICD-10-CM

## 2023-10-26 PROCEDURE — 99214 OFFICE O/P EST MOD 30 MIN: CPT

## 2023-10-26 RX ORDER — CLOTRIMAZOLE AND BETAMETHASONE DIPROPIONATE 10; .5 MG/G; MG/G
1-0.05 CREAM TOPICAL TWICE DAILY
Qty: 1 | Refills: 2 | Status: ACTIVE | COMMUNITY
Start: 2021-04-10 | End: 1900-01-01

## 2023-10-27 ENCOUNTER — APPOINTMENT (OUTPATIENT)
Dept: UROLOGY | Facility: CLINIC | Age: 78
End: 2023-10-27
Payer: MEDICARE

## 2023-10-27 PROCEDURE — 64566 NEUROELTRD STIM POST TIBIAL: CPT

## 2023-10-29 PROBLEM — D49.7 ADRENAL NEOPLASM: Status: ACTIVE | Noted: 2017-07-05

## 2023-11-03 ENCOUNTER — APPOINTMENT (OUTPATIENT)
Dept: UROLOGY | Facility: CLINIC | Age: 78
End: 2023-11-03
Payer: MEDICARE

## 2023-11-03 PROCEDURE — 64566 NEUROELTRD STIM POST TIBIAL: CPT

## 2023-11-03 RX ORDER — PHENAZOPYRIDINE HYDROCHLORIDE 200 MG/1
200 TABLET ORAL
Qty: 15 | Refills: 0 | Status: ACTIVE | COMMUNITY
Start: 2022-10-27 | End: 1900-01-01

## 2023-11-08 LAB — BACTERIA UR CULT: ABNORMAL

## 2023-11-10 ENCOUNTER — APPOINTMENT (OUTPATIENT)
Dept: UROLOGY | Facility: CLINIC | Age: 78
End: 2023-11-10
Payer: MEDICARE

## 2023-11-10 DIAGNOSIS — R39.15 URGENCY OF URINATION: ICD-10-CM

## 2023-11-10 DIAGNOSIS — R35.0 FREQUENCY OF MICTURITION: ICD-10-CM

## 2023-11-10 PROCEDURE — 64566 NEUROELTRD STIM POST TIBIAL: CPT

## 2023-11-14 RX ORDER — CIPROFLOXACIN HYDROCHLORIDE 500 MG/1
500 TABLET, FILM COATED ORAL TWICE DAILY
Qty: 14 | Refills: 0 | Status: ACTIVE | COMMUNITY
Start: 2023-11-03 | End: 1900-01-01

## 2023-11-17 ENCOUNTER — APPOINTMENT (OUTPATIENT)
Dept: UROLOGY | Facility: CLINIC | Age: 78
End: 2023-11-17

## 2023-12-01 ENCOUNTER — APPOINTMENT (OUTPATIENT)
Dept: UROLOGY | Facility: CLINIC | Age: 78
End: 2023-12-01

## 2023-12-29 ENCOUNTER — APPOINTMENT (OUTPATIENT)
Dept: UROLOGY | Facility: CLINIC | Age: 78
End: 2023-12-29
Payer: MEDICARE

## 2023-12-29 VITALS
HEIGHT: 68 IN | HEART RATE: 78 BPM | DIASTOLIC BLOOD PRESSURE: 67 MMHG | BODY MASS INDEX: 27.28 KG/M2 | SYSTOLIC BLOOD PRESSURE: 166 MMHG | WEIGHT: 180 LBS

## 2023-12-29 DIAGNOSIS — N13.8 BENIGN PROSTATIC HYPERPLASIA WITH LOWER URINARY TRACT SYMPMS: ICD-10-CM

## 2023-12-29 DIAGNOSIS — N40.1 BENIGN PROSTATIC HYPERPLASIA WITH LOWER URINARY TRACT SYMPMS: ICD-10-CM

## 2023-12-29 DIAGNOSIS — N52.9 MALE ERECTILE DYSFUNCTION, UNSPECIFIED: ICD-10-CM

## 2023-12-29 PROCEDURE — 99214 OFFICE O/P EST MOD 30 MIN: CPT

## 2023-12-29 RX ORDER — PAPAVERINE HYDROCHLORIDE 30 MG/ML
30 INJECTION, SOLUTION INTRAVENOUS
Qty: 0.5 | Refills: 0 | Status: ACTIVE | COMMUNITY
Start: 2023-12-29 | End: 1900-01-01

## 2023-12-29 RX ORDER — TRIAMCINOLONE ACETONIDE 1 MG/G
0.1 CREAM TOPICAL TWICE DAILY
Qty: 1 | Refills: 0 | Status: ACTIVE | COMMUNITY
Start: 2023-12-29 | End: 1900-01-01

## 2023-12-29 RX ORDER — NYSTATIN 100000 [USP'U]/G
100000 CREAM TOPICAL TWICE DAILY
Qty: 1 | Refills: 0 | Status: ACTIVE | COMMUNITY
Start: 2023-12-29 | End: 1900-01-01

## 2023-12-29 RX ORDER — CLOTRIMAZOLE AND BETAMETHASONE DIPROPIONATE 10; .5 MG/G; MG/G
1-0.05 CREAM TOPICAL
Qty: 1 | Refills: 0 | Status: ACTIVE | COMMUNITY
Start: 2023-12-29 | End: 1900-01-01

## 2023-12-29 NOTE — PLAN

## 2023-12-29 NOTE — HISTORY OF PRESENT ILLNESS
[FreeTextEntry1] : 78 yo with adrenal myelolipoma - right sided - 7 cmhistory of BPH with retention - now on BID flomax and proscar Also w history of nephrolithiasis - recent ureteroscopy laser lithotripsy and stent placement - encountered distal ureteral stricture 8/17/2022.  Office visit 12/29/23 BINU WAGONER is a 78 year M presents chief complaint of erectile dysfunction. He states that this has been present for the past several years. His erections are not adequate enough for penetrative intercourse. His erections are not modified with the degree of sexual stimulation. He states that his erections presently are often less than 5 out of 10 in both tumescence and rigidity, insufficient for penetration. He has difficulty maintaining an erection. He describes a normal libido. His sexual dysfunction occurs with both sexual relations and masturbation. His erections are not improved with PDE5 inhibitors. He is  to his [place] wife for the past 40 years.  Patient reports following up with Dr. Hanks in the past for his erectile dysfunction when he was treated with intracavernosal injections he does not recall his dose or concentrations but reports having a satisfactory result.

## 2023-12-29 NOTE — ASSESSMENT
[FreeTextEntry1] : #Balanitis - previously tried lotrisone with minimal relief - start nystatin/triamcinolone cream BID for 2 weeks - not interested in circumcision at this time  Discussed management options: 1) Topical steroids/antifungal with proper daily hygiene  2) Circumcision - The patient and I talked about how a circumcision is done and the risks and benefits of the procedure. He understands that the risks include bleeding, infection, change (increase or decrease) in sensation, scarring, and urethral injury. Because the procedure is being done as an adult there is almost always more scarring and there can be "gathering" at the incision line. He also knows that at the suture line, there will be a difference in skin color proximally and distally. The patient was shown where the incisions would be. He understands that he will have stitches when he goes home and these will dissolve with time, but will dissolve too soon if he does not follow his post op instructions. He will need to abstain from sexual activity for at least 6 weeks.   #ED - Plan for Trial of Intracavernosal penile injections with Trimix (Papaverine 30mg/mL, phentolamine 1mg/mL, PGE1 10 mcg/mL), prescription sent to compounding pharmacy for test dose to be administered by MD/PA  Erectile dysfunction, its etiology, risk factors and natural history were discussed with the patient. Work-up and empiric management were discussed. From least to most invasive, treatments including behavioral changes (weight loss, exercise, improved sleep), oral PDE5i, vacuum erection device, intraurethral pellets, intracavernosal injections, and penile prosthetic implantation were described. Relevant individual risks and benefits disclosed. I explained that there are different causes for ED including psychogenic, vasculogenic, neurogenic and medication side-effect related causes. Oftentimes there are multiple causes.   The patient understands that the risks of PDE5is include facial redness, flushing, GERD, back pain, priapism, chest pain/MI, arrhythmia, dizziness, drop in BP, impaired vision and loss of hearing. He understands that the medication may take up to an hour to function and requires sexual stimulation. He was told not to take his medication within 4 hours of alpha blockers, or not at all if ever taking nitroglycerin. Both sildenafil and vardenafil, but not tadalafil, have some cross-reactivity with PDE6 and thus may produce visual side effects. He also was told to go to the ER immediately if he noticed any blindness or visual changes, or for any painful erection lasting > 4 hrs. He denies any history nitrate medication use for angina.

## 2024-01-17 RX ORDER — TAMSULOSIN HYDROCHLORIDE 0.4 MG/1
0.4 CAPSULE ORAL
Qty: 120 | Refills: 5 | Status: ACTIVE | COMMUNITY
Start: 2024-01-17 | End: 1900-01-01

## 2024-01-19 ENCOUNTER — APPOINTMENT (OUTPATIENT)
Dept: UROLOGY | Facility: CLINIC | Age: 79
End: 2024-01-19
Payer: MEDICARE

## 2024-01-19 VITALS
HEART RATE: 68 BPM | BODY MASS INDEX: 28.79 KG/M2 | TEMPERATURE: 97.1 F | HEIGHT: 68 IN | WEIGHT: 190 LBS | SYSTOLIC BLOOD PRESSURE: 155 MMHG | RESPIRATION RATE: 17 BRPM | OXYGEN SATURATION: 95 % | DIASTOLIC BLOOD PRESSURE: 76 MMHG

## 2024-01-19 DIAGNOSIS — N52.9 MALE ERECTILE DYSFUNCTION, UNSPECIFIED: ICD-10-CM

## 2024-01-19 DIAGNOSIS — N47.1 PHIMOSIS: ICD-10-CM

## 2024-01-19 DIAGNOSIS — N48.1 BALANITIS: ICD-10-CM

## 2024-01-19 PROCEDURE — 99214 OFFICE O/P EST MOD 30 MIN: CPT | Mod: 25

## 2024-01-19 PROCEDURE — J3490T: CUSTOM | Mod: NC

## 2024-01-19 PROCEDURE — 54235 NJX CORPORA CAVERNOSA RX AGT: CPT

## 2024-01-19 RX ORDER — KETOCONAZOLE 20 MG/G
2 CREAM TOPICAL TWICE DAILY
Qty: 1 | Refills: 0 | Status: ACTIVE | COMMUNITY
Start: 2024-01-19 | End: 1900-01-01

## 2024-01-19 RX ORDER — PAPAVERINE HYDROCHLORIDE 30 MG/ML
30 INJECTION, SOLUTION INTRAVENOUS
Qty: 10 | Refills: 3 | Status: ACTIVE | COMMUNITY
Start: 2024-01-19 | End: 1900-01-01

## 2024-01-19 NOTE — PHYSICAL EXAM
[Normal Appearance] : normal appearance [Well Groomed] : well groomed [General Appearance - In No Acute Distress] : no acute distress [Edema] : no peripheral edema [Respiration, Rhythm And Depth] : normal respiratory rhythm and effort [Exaggerated Use Of Accessory Muscles For Inspiration] : no accessory muscle use [Abdomen Soft] : soft [Abdomen Tenderness] : non-tender [Costovertebral Angle Tenderness] : no ~M costovertebral angle tenderness [Urethral Meatus] : meatus normal [Penis Abnormality] : normal uncircumcised penis [Urinary Bladder Findings] : the bladder was normal on palpation [Testes Tenderness] : no tenderness of the testes [Normal Station and Gait] : the gait and station were normal for the patient's age [] : no rash [No Focal Deficits] : no focal deficits [Oriented To Time, Place, And Person] : oriented to person, place, and time [Affect] : the affect was normal [Mood] : the mood was normal [Not Anxious] : not anxious

## 2024-01-19 NOTE — HISTORY OF PRESENT ILLNESS
[FreeTextEntry1] : 76 yo with adrenal myelolipoma - right sided - 7 cmhistory of BPH with retention - now on BID flomax and proscar Also w history of nephrolithiasis - recent ureteroscopy laser lithotripsy and stent placement - encountered distal ureteral stricture 8/17/2022  Office visit 12/29/23 BINU WAGONER is a 78 year M presents chief complaint of erectile dysfunction. He states that this has been present for the past several years. His erections are not adequate enough for penetrative intercourse. His erections are not modified with the degree of sexual stimulation. He states that his erections presently are often less than 5 out of 10 in both tumescence and rigidity, insufficient for penetration. He has difficulty maintaining an erection. He describes a normal libido. His sexual dysfunction occurs with both sexual relations and masturbation. His erections are not improved with PDE5 inhibitors. He is  to his [place] wife for the past 40 years.  Patient reports following up with Dr. Hanks in the past for his erectile dysfunction when he was treated with intracavernosal injections he does not recall his dose or concentrations but reports having a satisfactory result.    Visit 1/19/2024: Patient with long history of erectile dysfunction presents today for Trimix training.  Please see separate note for further details.  No improvement on PDE 5 inhibitors and has used injections in the past with some improvement.  Overall voiding well.

## 2024-01-19 NOTE — ASSESSMENT
[FreeTextEntry1] : #ED Patient educated and watched injecting 0.2 of Trimix formula #5 in right corpora without any adverse events.  Patient had good response with 95% tumescence and 90% Rigidity. Pt understands risk of priapism and report back to my office or the nearest ED if he has an erection lasting longer than 4 hours.   #Balanitis/Phimosis Patient with bad balanitis and phimosis.  Has tried nystatin/triamcinolone as well as clotrimazole/betamethasone.  Will try ketoconazole.  Consider circumcision.  All aspects of circumcision reviewed.  He will follow-up in a few weeks to discuss.   Discussed management options: 1) Topical steroids/antifungal with proper daily hygiene  2) Circumcision - The patient and I talked about how a circumcision is done and the risks and benefits of the procedure. He understands that the risks include bleeding, infection, change (increase or decrease) in sensation, scarring, and urethral injury. Because the procedure is being done as an adult there is almost always more scarring and there can be "gathering" at the incision line. He also knows that at the suture line, there will be a difference in skin color proximally and distally. The patient was shown where the incisions would be. He understands that he will have stitches when he goes home and these will dissolve with time, but will dissolve too soon if he does not follow his post op instructions. He will need to abstain from sexual activity for at least 6 weeks.

## 2024-03-29 ENCOUNTER — APPOINTMENT (OUTPATIENT)
Dept: UROLOGY | Facility: CLINIC | Age: 79
End: 2024-03-29

## 2024-03-29 NOTE — HISTORY OF PRESENT ILLNESS
[FreeTextEntry1] : 78 yo with adrenal myelolipoma - right sided - 7 cmhistory of BPH with retention - now on BID flomax and proscar Also w history of nephrolithiasis - recent ureteroscopy laser lithotripsy and stent placement - encountered distal ureteral stricture 8/17/2022  Office visit 12/29/23 BINU WAGONER is a 78 year M presents chief complaint of erectile dysfunction. He states that this has been present for the past several years. His erections are not adequate enough for penetrative intercourse. His erections are not modified with the degree of sexual stimulation. He states that his erections presently are often less than 5 out of 10 in both tumescence and rigidity, insufficient for penetration. He has difficulty maintaining an erection. He describes a normal libido. His sexual dysfunction occurs with both sexual relations and masturbation. His erections are not improved with PDE5 inhibitors. He is  to his [place] wife for the past 40 years. Patient reports following up with Dr. Hanks in the past for his erectile dysfunction when he was treated with intracavernosal injections he does not recall his dose or concentrations but reports having a satisfactory result.  Visit 1/19/2024: Patient with long history of erectile dysfunction presents today for Trimix training. Please see separate note for further details. No improvement on PDE 5 inhibitors and has used injections in the past with some improvement. Overall voiding well.

## 2024-03-29 NOTE — ASSESSMENT
[FreeTextEntry1] : #ED - 0.2 of Trimix formula #5, previously had good response with 95% tumescence and 90% Rigidity. Pt understands risk of priapism and report back to my office or the nearest ED if he has an erection lasting longer than 4 hours.  #Balanitis/Phimosis Patient with bad balanitis and phimosis. Has tried nystatin/triamcinolone as well as clotrimazole/betamethasone. Will try ketoconazole. Consider circumcision. All aspects of circumcision reviewed. He will follow-up in a few weeks to discuss.  Discussed management options: 1) Topical steroids/antifungal with proper daily hygiene 2) Circumcision - The patient and I talked about how a circumcision is done and the risks and benefits of the procedure. He understands that the risks include bleeding, infection, change (increase or decrease) in sensation, scarring, and urethral injury. Because the procedure is being done as an adult there is almost always more scarring and there can be "gathering" at the incision line. He also knows that at the suture line, there will be a difference in skin color proximally and distally. The patient was shown where the incisions would be. He understands that he will have stitches when he goes home and these will dissolve with time, but will dissolve too soon if he does not follow his post op instructions. He will need to abstain from sexual activity for at least 6 weeks.

## 2024-04-22 NOTE — DISCHARGE NOTE NURSING/CASE MANAGEMENT/SOCIAL WORK - NSDCPETBCESMAN_GEN_ALL_CORE
Group Therapy Note    Date: 4/22/2024    Group Start Time: 1400  Group End Time: 1445  Group Topic: Psychoeducation        Sunita Nichols        Group Therapy Note    Attendees: 2  Group Topic- Discussing Positive relationships and how to build and maintain healthy relationships.      Patient declined to participate  Discipline Responsible: /Counselor      Signature:  Sunita Nichols     If you are a smoker, it is important for your health to stop smoking. Please be aware that second hand smoke is also harmful.

## 2024-04-28 NOTE — PATIENT PROFILE ADULT - TOBACCO USE
No further intervention taken at this time. No signs of active bleeding noted at this time. Will endorse to AM RN of situation.
Never smoker

## 2024-06-10 ENCOUNTER — RX RENEWAL (OUTPATIENT)
Age: 79
End: 2024-06-10

## 2024-06-10 RX ORDER — FINASTERIDE 5 MG/1
5 TABLET, FILM COATED ORAL
Qty: 90 | Refills: 3 | Status: ACTIVE | COMMUNITY
Start: 2023-04-24 | End: 1900-01-01

## 2024-07-10 NOTE — H&P ADULT - PATIENT'S GENDER IDENTITY
Problem: Adult Inpatient Plan of Care  Goal: Absence of Hospital-Acquired Illness or Injury  Outcome: Progressing  Intervention: Identify and Manage Fall Risk  Recent Flowsheet Documentation  Taken 7/9/2024 1600 by Mike Swan RN  Safety Promotion/Fall Prevention:   activity supervised   assistive device/personal items within reach   bedside attendant   clutter free environment maintained   increased rounding and observation   increase visualization of patient   lighting adjusted   nonskid shoes/slippers when out of bed   patient and family education   room door open   room organization consistent   safety round/check completed  Intervention: Prevent Skin Injury  Recent Flowsheet Documentation  Taken 7/9/2024 1600 by Mike Swan RN  Body Position:   weight shifting   heels elevated  Skin Protection:   adhesive use limited   incontinence pads utilized  Device Skin Pressure Protection:   absorbent pad utilized/changed   adhesive use limited   pressure points protected  Intervention: Prevent and Manage VTE (Venous Thromboembolism) Risk  Recent Flowsheet Documentation  Taken 7/9/2024 1600 by Mike Swan RN  VTE Prevention/Management: SCDs on (sequential compression devices)     Problem: Heart Failure  Goal: Optimal Coping  Outcome: Progressing   Goal Outcome Evaluation:       VSS on 2L O2, not tolerating weaning. SR on tele. A&Ox4, forgetful. Denies pain. Up w/ 1 & W. PW on in bed. Had 2 small BMs (brown). IV lasix given per order. PRN melatonin given for sleep aide. Family visiting at bedside.    Male

## 2024-10-28 ENCOUNTER — APPOINTMENT (OUTPATIENT)
Dept: UROLOGY | Facility: CLINIC | Age: 79
End: 2024-10-28

## 2024-11-11 ENCOUNTER — RX RENEWAL (OUTPATIENT)
Age: 79
End: 2024-11-11

## 2024-11-23 ENCOUNTER — RESULT REVIEW (OUTPATIENT)
Age: 79
End: 2024-11-23

## 2024-11-23 ENCOUNTER — OUTPATIENT (OUTPATIENT)
Dept: OUTPATIENT SERVICES | Facility: HOSPITAL | Age: 79
LOS: 1 days | End: 2024-11-23
Payer: MEDICARE

## 2024-11-23 DIAGNOSIS — Z00.8 ENCOUNTER FOR OTHER GENERAL EXAMINATION: ICD-10-CM

## 2024-11-23 DIAGNOSIS — Z98.890 OTHER SPECIFIED POSTPROCEDURAL STATES: Chronic | ICD-10-CM

## 2024-11-23 DIAGNOSIS — Z98.1 ARTHRODESIS STATUS: Chronic | ICD-10-CM

## 2024-11-23 DIAGNOSIS — R91.1 SOLITARY PULMONARY NODULE: ICD-10-CM

## 2024-11-23 PROCEDURE — 71271 CT THORAX LUNG CANCER SCR C-: CPT

## 2024-11-23 PROCEDURE — 71271 CT THORAX LUNG CANCER SCR C-: CPT | Mod: 26

## 2024-11-24 DIAGNOSIS — R91.1 SOLITARY PULMONARY NODULE: ICD-10-CM

## 2024-12-20 ENCOUNTER — OUTPATIENT (OUTPATIENT)
Dept: OUTPATIENT SERVICES | Facility: HOSPITAL | Age: 79
LOS: 1 days | End: 2024-12-20
Payer: MEDICARE

## 2024-12-20 ENCOUNTER — RESULT REVIEW (OUTPATIENT)
Age: 79
End: 2024-12-20

## 2024-12-20 DIAGNOSIS — Z98.890 OTHER SPECIFIED POSTPROCEDURAL STATES: Chronic | ICD-10-CM

## 2024-12-20 DIAGNOSIS — Z00.8 ENCOUNTER FOR OTHER GENERAL EXAMINATION: ICD-10-CM

## 2024-12-20 DIAGNOSIS — R91.8 OTHER NONSPECIFIC ABNORMAL FINDING OF LUNG FIELD: ICD-10-CM

## 2024-12-20 DIAGNOSIS — Z98.1 ARTHRODESIS STATUS: Chronic | ICD-10-CM

## 2024-12-20 LAB — GLUCOSE BLDC GLUCOMTR-MCNC: 153 MG/DL — HIGH (ref 70–99)

## 2024-12-20 PROCEDURE — A9552: CPT

## 2024-12-20 PROCEDURE — 82962 GLUCOSE BLOOD TEST: CPT

## 2024-12-20 PROCEDURE — 78815 PET IMAGE W/CT SKULL-THIGH: CPT | Mod: 26,PI

## 2024-12-20 PROCEDURE — 78815 PET IMAGE W/CT SKULL-THIGH: CPT | Mod: PI

## 2024-12-21 DIAGNOSIS — R91.8 OTHER NONSPECIFIC ABNORMAL FINDING OF LUNG FIELD: ICD-10-CM

## 2025-02-25 NOTE — H&P ADULT - NSCORESITESY/N_GEN_A_CORE_RD
Routing to Felice Edmonds.  See mychart request below for a referral. Please advise if patient needs an appointment?    LESLEY Pinzon  [facility]     No

## 2025-04-22 ENCOUNTER — RX RENEWAL (OUTPATIENT)
Age: 80
End: 2025-04-22

## 2025-05-09 NOTE — ED ADULT NURSE NOTE - CHIEF COMPLAINT
[place] provider: Yes AS A [facility] PROVIDER  If not, is there a third party vendor: [facility] [facility]   CPT Codes: HA Consult:                 84643 and [id number]  
The patient is a 76y Male complaining of flank pain.

## 2025-05-28 ENCOUNTER — APPOINTMENT (OUTPATIENT)
Dept: UROLOGY | Facility: CLINIC | Age: 80
End: 2025-05-28
Payer: MEDICARE

## 2025-05-28 VITALS
WEIGHT: 200 LBS | SYSTOLIC BLOOD PRESSURE: 149 MMHG | HEART RATE: 62 BPM | OXYGEN SATURATION: 97 % | BODY MASS INDEX: 30.31 KG/M2 | HEIGHT: 68 IN | TEMPERATURE: 97.7 F | RESPIRATION RATE: 16 BRPM | DIASTOLIC BLOOD PRESSURE: 73 MMHG

## 2025-05-28 DIAGNOSIS — R93.49 ABNORMAL RADIOLOGIC FINDINGS ON DIAGNOSITIC IMAGING OF OTHER URINARY ORGANS: ICD-10-CM

## 2025-05-28 DIAGNOSIS — N13.8 BENIGN PROSTATIC HYPERPLASIA WITH LOWER URINARY TRACT SYMPMS: ICD-10-CM

## 2025-05-28 DIAGNOSIS — N40.1 BENIGN PROSTATIC HYPERPLASIA WITH LOWER URINARY TRACT SYMPMS: ICD-10-CM

## 2025-05-28 LAB
ANION GAP SERPL CALC-SCNC: 15 MMOL/L
BUN SERPL-MCNC: 21 MG/DL
CALCIUM SERPL-MCNC: 9.7 MG/DL
CHLORIDE SERPL-SCNC: 97 MMOL/L
CO2 SERPL-SCNC: 26 MMOL/L
CREAT SERPL-MCNC: 0.8 MG/DL
EGFRCR SERPLBLD CKD-EPI 2021: 90 ML/MIN/1.73M2
GLUCOSE SERPL-MCNC: 164 MG/DL
POTASSIUM SERPL-SCNC: 4.2 MMOL/L
PSA FREE FLD-MCNC: 22 %
PSA FREE SERPL-MCNC: 0.23 NG/ML
PSA SERPL-MCNC: 1.05 NG/ML
SODIUM SERPL-SCNC: 138 MMOL/L

## 2025-05-28 PROCEDURE — 51798 US URINE CAPACITY MEASURE: CPT

## 2025-05-28 PROCEDURE — 99214 OFFICE O/P EST MOD 30 MIN: CPT

## 2025-06-02 PROBLEM — R93.49 ABNORMAL FINDINGS ON DIAGNOSTIC IMAGING OF URINARY ORGANS: Status: ACTIVE | Noted: 2025-06-02

## 2025-06-03 ENCOUNTER — RESULT REVIEW (OUTPATIENT)
Age: 80
End: 2025-06-03

## 2025-06-03 ENCOUNTER — OUTPATIENT (OUTPATIENT)
Dept: OUTPATIENT SERVICES | Facility: HOSPITAL | Age: 80
LOS: 1 days | End: 2025-06-03
Payer: MEDICARE

## 2025-06-03 DIAGNOSIS — N40.1 BENIGN PROSTATIC HYPERPLASIA WITH LOWER URINARY TRACT SYMPTOMS: ICD-10-CM

## 2025-06-03 DIAGNOSIS — Z98.890 OTHER SPECIFIED POSTPROCEDURAL STATES: Chronic | ICD-10-CM

## 2025-06-03 DIAGNOSIS — Z98.1 ARTHRODESIS STATUS: Chronic | ICD-10-CM

## 2025-06-03 DIAGNOSIS — Z00.8 ENCOUNTER FOR OTHER GENERAL EXAMINATION: ICD-10-CM

## 2025-06-03 PROCEDURE — 76770 US EXAM ABDO BACK WALL COMP: CPT

## 2025-06-03 PROCEDURE — 76770 US EXAM ABDO BACK WALL COMP: CPT | Mod: 26

## 2025-06-04 DIAGNOSIS — N40.1 BENIGN PROSTATIC HYPERPLASIA WITH LOWER URINARY TRACT SYMPTOMS: ICD-10-CM

## 2025-06-07 ENCOUNTER — LABORATORY RESULT (OUTPATIENT)
Age: 80
End: 2025-06-07

## 2025-07-10 ENCOUNTER — RX RENEWAL (OUTPATIENT)
Age: 80
End: 2025-07-10

## 2025-07-17 ENCOUNTER — APPOINTMENT (OUTPATIENT)
Dept: UROLOGY | Facility: CLINIC | Age: 80
End: 2025-07-17
Payer: MEDICARE

## 2025-07-17 VITALS
DIASTOLIC BLOOD PRESSURE: 74 MMHG | HEART RATE: 66 BPM | RESPIRATION RATE: 18 BRPM | OXYGEN SATURATION: 98 % | BODY MASS INDEX: 30.31 KG/M2 | HEIGHT: 68 IN | SYSTOLIC BLOOD PRESSURE: 178 MMHG | WEIGHT: 200 LBS

## 2025-07-17 PROCEDURE — 99214 OFFICE O/P EST MOD 30 MIN: CPT

## 2025-09-11 ENCOUNTER — APPOINTMENT (OUTPATIENT)
Dept: ORTHOPEDIC SURGERY | Facility: CLINIC | Age: 80
End: 2025-09-11
Payer: MEDICARE

## 2025-09-11 DIAGNOSIS — M70.62 TROCHANTERIC BURSITIS, LEFT HIP: ICD-10-CM

## 2025-09-11 PROCEDURE — 99203 OFFICE O/P NEW LOW 30 MIN: CPT | Mod: 25

## 2025-09-11 PROCEDURE — 73502 X-RAY EXAM HIP UNI 2-3 VIEWS: CPT

## 2025-09-11 RX ORDER — METHYLPREDNISOLONE 4 MG/1
4 TABLET ORAL
Qty: 1 | Refills: 0 | Status: ACTIVE | COMMUNITY
Start: 2025-09-11 | End: 1900-01-01